# Patient Record
Sex: FEMALE | Race: WHITE | NOT HISPANIC OR LATINO | Employment: STUDENT | URBAN - METROPOLITAN AREA
[De-identification: names, ages, dates, MRNs, and addresses within clinical notes are randomized per-mention and may not be internally consistent; named-entity substitution may affect disease eponyms.]

---

## 2017-01-15 DIAGNOSIS — N94.0 OVULATION PAIN: ICD-10-CM

## 2017-01-16 RX ORDER — NORETHINDRONE ACETATE AND ETHINYL ESTRADIOL AND FERROUS FUMARATE 1.5-30(21)
KIT ORAL
Qty: 28 TABLET | Refills: 0 | OUTPATIENT
Start: 2017-01-16

## 2017-10-28 DIAGNOSIS — N94.6 DYSMENORRHEA: ICD-10-CM

## 2017-10-28 DIAGNOSIS — Z87.42 HISTORY OF OVARIAN CYST: ICD-10-CM

## 2017-10-30 RX ORDER — NORETHINDRONE ACETATE AND ETHINYL ESTRADIOL AND FERROUS FUMARATE 1.5-30(21)
KIT ORAL
Qty: 84 TABLET | Refills: 0 | Status: SHIPPED | OUTPATIENT
Start: 2017-10-30 | End: 2018-01-16 | Stop reason: SDUPTHER

## 2018-01-16 DIAGNOSIS — N94.6 DYSMENORRHEA: ICD-10-CM

## 2018-01-16 DIAGNOSIS — Z87.42 HISTORY OF OVARIAN CYST: ICD-10-CM

## 2018-01-16 RX ORDER — NORETHINDRONE ACETATE AND ETHINYL ESTRADIOL 1.5-30(21)
KIT ORAL
Qty: 84 TABLET | Refills: 0 | Status: SHIPPED | OUTPATIENT
Start: 2018-01-16 | End: 2018-04-09 | Stop reason: SDUPTHER

## 2018-04-09 DIAGNOSIS — Z87.42 HISTORY OF OVARIAN CYST: ICD-10-CM

## 2018-04-09 DIAGNOSIS — N94.6 DYSMENORRHEA: ICD-10-CM

## 2018-04-09 RX ORDER — NORETHINDRONE ACETATE AND ETHINYL ESTRADIOL 1.5-30(21)
KIT ORAL
Qty: 84 TABLET | Refills: 0 | Status: SHIPPED | OUTPATIENT
Start: 2018-04-09 | End: 2018-06-27 | Stop reason: SDUPTHER

## 2018-06-27 DIAGNOSIS — Z87.42 HISTORY OF OVARIAN CYST: ICD-10-CM

## 2018-06-27 DIAGNOSIS — N94.6 DYSMENORRHEA: ICD-10-CM

## 2018-06-27 RX ORDER — NORETHINDRONE ACETATE AND ETHINYL ESTRADIOL 1.5-30(21)
KIT ORAL
Qty: 84 TABLET | Refills: 0 | Status: SHIPPED | OUTPATIENT
Start: 2018-06-27 | End: 2018-09-13 | Stop reason: SDUPTHER

## 2018-09-13 DIAGNOSIS — Z87.42 HISTORY OF OVARIAN CYST: ICD-10-CM

## 2018-09-13 DIAGNOSIS — N94.6 DYSMENORRHEA: ICD-10-CM

## 2018-09-13 RX ORDER — NORETHINDRONE ACETATE AND ETHINYL ESTRADIOL 1.5-30(21)
KIT ORAL
Qty: 84 TABLET | Refills: 0 | Status: SHIPPED | OUTPATIENT
Start: 2018-09-13 | End: 2018-12-04 | Stop reason: SDUPTHER

## 2018-12-04 DIAGNOSIS — Z87.42 HISTORY OF OVARIAN CYST: ICD-10-CM

## 2018-12-04 DIAGNOSIS — N94.6 DYSMENORRHEA: ICD-10-CM

## 2018-12-05 RX ORDER — NORETHINDRONE ACETATE AND ETHINYL ESTRADIOL 1.5-30(21)
KIT ORAL
Qty: 84 TABLET | Refills: 0 | Status: SHIPPED | OUTPATIENT
Start: 2018-12-05 | End: 2019-02-22 | Stop reason: SDUPTHER

## 2019-02-22 DIAGNOSIS — Z87.42 HISTORY OF OVARIAN CYST: ICD-10-CM

## 2019-02-22 DIAGNOSIS — N94.6 DYSMENORRHEA: ICD-10-CM

## 2019-02-22 RX ORDER — NORETHINDRONE ACETATE AND ETHINYL ESTRADIOL 1.5-30(21)
KIT ORAL
Qty: 84 TABLET | Refills: 0 | Status: SHIPPED | OUTPATIENT
Start: 2019-02-22 | End: 2019-05-19 | Stop reason: SDUPTHER

## 2019-05-19 DIAGNOSIS — N94.6 DYSMENORRHEA: ICD-10-CM

## 2019-05-19 DIAGNOSIS — Z87.42 HISTORY OF OVARIAN CYST: ICD-10-CM

## 2019-05-20 RX ORDER — NORETHINDRONE ACETATE AND ETHINYL ESTRADIOL 1.5-30(21)
KIT ORAL
Qty: 84 TABLET | Refills: 0 | Status: SHIPPED | OUTPATIENT
Start: 2019-05-20 | End: 2019-08-11 | Stop reason: SDUPTHER

## 2019-08-11 DIAGNOSIS — N94.6 DYSMENORRHEA: ICD-10-CM

## 2019-08-11 DIAGNOSIS — Z87.42 HISTORY OF OVARIAN CYST: ICD-10-CM

## 2019-08-12 RX ORDER — NORETHINDRONE ACETATE AND ETHINYL ESTRADIOL 1.5-30(21)
KIT ORAL
Qty: 84 TABLET | Refills: 0 | Status: SHIPPED | OUTPATIENT
Start: 2019-08-12 | End: 2019-11-07 | Stop reason: SDUPTHER

## 2019-11-07 DIAGNOSIS — Z87.42 HISTORY OF OVARIAN CYST: ICD-10-CM

## 2019-11-07 DIAGNOSIS — N94.6 DYSMENORRHEA: ICD-10-CM

## 2019-11-07 RX ORDER — NORETHINDRONE ACETATE AND ETHINYL ESTRADIOL 1.5-30(21)
KIT ORAL
Qty: 84 TABLET | Refills: 0 | Status: SHIPPED | OUTPATIENT
Start: 2019-11-07 | End: 2019-12-17 | Stop reason: SDUPTHER

## 2019-12-17 ENCOUNTER — OFFICE VISIT (OUTPATIENT)
Dept: OBSTETRICS AND GYNECOLOGY | Facility: CLINIC | Age: 24
End: 2019-12-17
Payer: COMMERCIAL

## 2019-12-17 VITALS — DIASTOLIC BLOOD PRESSURE: 72 MMHG | WEIGHT: 138 LBS | BODY MASS INDEX: 23.69 KG/M2 | SYSTOLIC BLOOD PRESSURE: 126 MMHG

## 2019-12-17 DIAGNOSIS — Z01.419 WOMEN'S ANNUAL ROUTINE GYNECOLOGICAL EXAMINATION: Primary | ICD-10-CM

## 2019-12-17 DIAGNOSIS — Z12.4 ENCOUNTER FOR PAPANICOLAOU SMEAR FOR CERVICAL CANCER SCREENING: ICD-10-CM

## 2019-12-17 DIAGNOSIS — Z30.9 ENCOUNTER FOR CONTRACEPTIVE MANAGEMENT, UNSPECIFIED TYPE: ICD-10-CM

## 2019-12-17 PROCEDURE — 99999 PR PBB SHADOW E&M-EST. PATIENT-LVL III: ICD-10-PCS | Mod: PBBFAC,,, | Performed by: NURSE PRACTITIONER

## 2019-12-17 PROCEDURE — 99999 PR PBB SHADOW E&M-EST. PATIENT-LVL III: CPT | Mod: PBBFAC,,, | Performed by: NURSE PRACTITIONER

## 2019-12-17 PROCEDURE — 99385 PREV VISIT NEW AGE 18-39: CPT | Mod: S$GLB,,, | Performed by: NURSE PRACTITIONER

## 2019-12-17 PROCEDURE — 88175 CYTOPATH C/V AUTO FLUID REDO: CPT

## 2019-12-17 PROCEDURE — 99385 PR PREVENTIVE VISIT,NEW,18-39: ICD-10-PCS | Mod: S$GLB,,, | Performed by: NURSE PRACTITIONER

## 2019-12-17 RX ORDER — NORETHINDRONE ACETATE AND ETHINYL ESTRADIOL 1.5-30(21)
1 KIT ORAL DAILY
Qty: 84 TABLET | Refills: 3 | Status: SHIPPED | OUTPATIENT
Start: 2019-12-17 | End: 2020-12-04 | Stop reason: SDUPTHER

## 2019-12-17 RX ORDER — LINACLOTIDE 145 UG/1
CAPSULE, GELATIN COATED ORAL
Refills: 0 | COMMUNITY
Start: 2019-11-18 | End: 2022-06-14

## 2019-12-17 RX ORDER — CETIRIZINE HYDROCHLORIDE 10 MG/1
TABLET ORAL
Refills: 0 | COMMUNITY
Start: 2019-10-14 | End: 2023-08-23

## 2019-12-17 NOTE — PROGRESS NOTES
CC: Annual  HPI: Pt is a 24 y.o.  female who presents for routine annual exam.  She recently got accepted in [employer].  She uses OCPs for contraception. She does not want STD screening.  Denies any GYN complaints.  The patient participates in regular exercise: yes.  The patient does not smoke.  The patient wears seatbelts.   Pt denies any domestic violence.  She is s/p the HPV vaccine series.        FH:  Breast cancer: paternal grandmother (PMB at dx)  Colon cancer: none  Ovarian cancer: none  Endometrial cancer: none    ROS:  GENERAL: Feeling well overall. Denies fever or chills.   SKIN: Denies rash or lesions.   HEAD: Denies head injury or headache.   NODES: Denies enlarged lymph nodes.   CHEST: Denies chest pain or shortness of breath.   CARDIOVASCULAR: Denies palpitations or left sided chest pain.   ABDOMEN: No abdominal pain, constipation, diarrhea, nausea, vomiting or rectal bleeding.   URINARY: No dysuria, hematuria, or burning on urination.  REPRODUCTIVE: See HPI.   BREASTS: Denies pain, lumps, or nipple discharge.   HEMATOLOGIC: No easy bruisability or excessive bleeding.   MUSCULOSKELETAL: Denies joint pain or swelling.   NEUROLOGIC: Denies syncope or weakness.   PSYCHIATRIC: Denies depression, anxiety or mood swings.    PE:   APPEARANCE: Well nourished, well developed, White female in no acute distress.  NODES: no cervical, supraclavicular, or inguinal lymphadenopathy  BREASTS: Symmetrical, no skin changes or visible lesions. No palpable masses, nipple discharge or adenopathy bilaterally.  ABDOMEN: Soft. No tenderness or masses. No distention. No hernias palpated. No CVA tenderness.  VULVA: No lesions. Normal external female genitalia.  URETHRAL MEATUS: Normal size and location, no lesions, no prolapse.  URETHRA: No masses, tenderness, or prolapse.  VAGINA: Moist. No lesions or lacerations noted. No abnormal discharge present. No odor present.   CERVIX: No lesions or discharge. No cervical motion  tenderness.   UTERUS: Normal size, regular shape, mobile, non-tender.  ADNEXA: No tenderness. No fullness or masses palpated in the adnexal regions.   ANUS PERINEUM: Normal.      Diagnosis:  1. Women's annual routine gynecological examination    2. Encounter for Papanicolaou smear for cervical cancer screening    3. Encounter for contraceptive management, unspecified type        Plan:   Pap  OCPs refilled     Orders Placed This Encounter    Liquid-Based Pap Smear, Screening    norethindrone-ethinyl estradiol-iron (BLISOVI FE 1.5/30, 28,) 1.5 mg-30 mcg (21)/75 mg (7) tablet       Patient was counseled today on the new ACS guidelines for cervical cytology screening as well as the current recommendations for breast cancer screening. She was counseled to follow up with her PCP for other routine health maintenance. Counseling session lasted approximately 10 minutes, and all her questions were answered.    Follow-up with me in 1 year for routine exam    Kelly Mena, CHASEC

## 2019-12-20 ENCOUNTER — PATIENT OUTREACH (OUTPATIENT)
Dept: ADMINISTRATIVE | Facility: HOSPITAL | Age: 24
End: 2019-12-20

## 2020-01-06 LAB
FINAL PATHOLOGIC DIAGNOSIS: NORMAL
Lab: NORMAL

## 2020-05-22 ENCOUNTER — TELEPHONE (OUTPATIENT)
Dept: INTERNAL MEDICINE | Facility: CLINIC | Age: 25
End: 2020-05-22

## 2020-05-22 NOTE — TELEPHONE ENCOUNTER
----- Message from Eduarda Nj sent at 5/22/2020 10:14 AM CDT -----  Contact: self  Type:  Requesting Shot Appointment Request    Name of Caller:patient need to schedule appointment for a TDAP shot  When is the first available appointment?  Symptoms:  Best Call Back Number:[phone]  Additional Information:

## 2020-06-18 ENCOUNTER — IMMUNIZATION (OUTPATIENT)
Dept: PHARMACY | Facility: CLINIC | Age: 25
End: 2020-06-18
Payer: COMMERCIAL

## 2020-12-04 ENCOUNTER — OFFICE VISIT (OUTPATIENT)
Dept: OBSTETRICS AND GYNECOLOGY | Facility: CLINIC | Age: 25
End: 2020-12-04
Payer: COMMERCIAL

## 2020-12-04 VITALS
SYSTOLIC BLOOD PRESSURE: 122 MMHG | DIASTOLIC BLOOD PRESSURE: 76 MMHG | HEIGHT: 64 IN | WEIGHT: 137.56 LBS | BODY MASS INDEX: 23.49 KG/M2

## 2020-12-04 DIAGNOSIS — Z30.41 ORAL CONTRACEPTIVE PILL SURVEILLANCE: ICD-10-CM

## 2020-12-04 DIAGNOSIS — Z01.419 ENCOUNTER FOR ANNUAL ROUTINE GYNECOLOGICAL EXAMINATION: Primary | ICD-10-CM

## 2020-12-04 DIAGNOSIS — B96.89 BV (BACTERIAL VAGINOSIS): ICD-10-CM

## 2020-12-04 DIAGNOSIS — N88.8 FRIABLE CERVIX: ICD-10-CM

## 2020-12-04 DIAGNOSIS — N76.0 BV (BACTERIAL VAGINOSIS): ICD-10-CM

## 2020-12-04 DIAGNOSIS — Z11.3 SCREENING EXAMINATION FOR STD (SEXUALLY TRANSMITTED DISEASE): ICD-10-CM

## 2020-12-04 LAB
BACTERIA HYPHAE, POC: NEGATIVE
GARDNERELLA VAGINALIS: NEGATIVE
OTHER MICROSC. OBSERVATIONS: ABNORMAL
POC BACTERIAL VAGINOSIS: NEGATIVE
POC CLUE CELLS: POSITIVE
TRICHOMONAS, POC: NEGATIVE
YEAST WET PREP: NEGATIVE
YEAST, POC: NEGATIVE

## 2020-12-04 PROCEDURE — 99395 PREV VISIT EST AGE 18-39: CPT | Mod: 25,S$GLB,, | Performed by: OBSTETRICS & GYNECOLOGY

## 2020-12-04 PROCEDURE — 87220 TISSUE EXAM FOR FUNGI: CPT | Mod: S$GLB,,, | Performed by: OBSTETRICS & GYNECOLOGY

## 2020-12-04 PROCEDURE — 87210 POCT WET PREP: ICD-10-PCS | Mod: QW,S$GLB,, | Performed by: OBSTETRICS & GYNECOLOGY

## 2020-12-04 PROCEDURE — 87210 SMEAR WET MOUNT SALINE/INK: CPT | Mod: QW,S$GLB,, | Performed by: OBSTETRICS & GYNECOLOGY

## 2020-12-04 PROCEDURE — 99999 PR PBB SHADOW E&M-EST. PATIENT-LVL III: ICD-10-PCS | Mod: PBBFAC,,, | Performed by: OBSTETRICS & GYNECOLOGY

## 2020-12-04 PROCEDURE — 99999 PR PBB SHADOW E&M-EST. PATIENT-LVL III: CPT | Mod: PBBFAC,,, | Performed by: OBSTETRICS & GYNECOLOGY

## 2020-12-04 PROCEDURE — 3008F BODY MASS INDEX DOCD: CPT | Mod: CPTII,S$GLB,, | Performed by: OBSTETRICS & GYNECOLOGY

## 2020-12-04 PROCEDURE — 87220 POCT KOH: ICD-10-PCS | Mod: S$GLB,,, | Performed by: OBSTETRICS & GYNECOLOGY

## 2020-12-04 PROCEDURE — 1126F AMNT PAIN NOTED NONE PRSNT: CPT | Mod: S$GLB,,, | Performed by: OBSTETRICS & GYNECOLOGY

## 2020-12-04 PROCEDURE — 3008F PR BODY MASS INDEX (BMI) DOCUMENTED: ICD-10-PCS | Mod: CPTII,S$GLB,, | Performed by: OBSTETRICS & GYNECOLOGY

## 2020-12-04 PROCEDURE — 99395 PR PREVENTIVE VISIT,EST,18-39: ICD-10-PCS | Mod: 25,S$GLB,, | Performed by: OBSTETRICS & GYNECOLOGY

## 2020-12-04 PROCEDURE — 1126F PR PAIN SEVERITY QUANTIFIED, NO PAIN PRESENT: ICD-10-PCS | Mod: S$GLB,,, | Performed by: OBSTETRICS & GYNECOLOGY

## 2020-12-04 RX ORDER — NORETHINDRONE ACETATE AND ETHINYL ESTRADIOL 1.5-30(21)
1 KIT ORAL DAILY
Qty: 84 TABLET | Refills: 3 | Status: SHIPPED | OUTPATIENT
Start: 2020-12-04 | End: 2021-12-07

## 2020-12-04 RX ORDER — METRONIDAZOLE 500 MG/1
500 TABLET ORAL 2 TIMES DAILY
Qty: 14 TABLET | Refills: 0 | Status: SHIPPED | OUTPATIENT
Start: 2020-12-04 | End: 2020-12-11

## 2020-12-04 NOTE — PROGRESS NOTES
"Chief Complaint: Well Woman Exam     HPI:      Erika Beavers is a 25 y.o.  who presents today for well woman exam.  LMP: Patient's last menstrual period was 2020 (exact date).  No issues, problems, or complaints. Specifically, patient denies abnormal vaginal bleeding, discharge, pelvic pain, urinary problems, or changes in appetite. Ms. Beavers is currently sexually active with a single male partner. She is currently using condoms, oral contraceptives (estrogen/progesterone) for contraception. She would like STD screening today.    Previous Pap:  NILM (2020)      Gardasil:Completed     Ms. Beavers confirms that she wears her seatbelt when riding in the car and does not text while driving.     OB History        0    Para   0    Term   0       0    AB   0    Living   0       SAB   0    TAB   0    Ectopic   0    Multiple   0    Live Births               Obstetric Comments   Menarche- 15             ROS:     GENERAL: Denies unintentional weight gain or weight loss. Feeling well overall.   SKIN: Denies rash or lesions.   HEENT: Denies headaches, or vision changes.   CARDIOVASCULAR: Denies palpitations or chest pain.   RESPIRATORY: Denies shortness of breath or dyspnea on exertion.  BREASTS: Denies pain, lumps, or nipple discharge.   ABDOMEN: Denies abdominal pain, constipation, diarrhea, nausea, vomiting, change in appetite.  URINARY: Denies frequency, dysuria, hematuria.  NEUROLOGIC: Denies syncope or weakness.   PSYCHIATRIC: Denies depression, anxiety or mood swings.    Physical Exam:      PHYSICAL EXAM:  /76   Ht 5' 4" (1.626 m)   Wt 62.4 kg (137 lb 9.1 oz)   LMP 2020 (Exact Date)   BMI 23.61 kg/m²   Body mass index is 23.61 kg/m².     APPEARANCE: Well nourished, well developed, in no acute distress.  PSYCH: Appropriate mood and affect.  SKIN: No acne or hirsutism  NECK: Neck symmetric without masses or thyromegaly  NODES: No inguinal, axillary, or supraclavicular lymph " node enlargement  ABDOMEN: Soft.  No tenderness or masses.    CARDIOVASCULAR: No edema of peripheral extremities  BREASTS: Symmetrical, no skin changes or visible lesions.  No palpable masses or nipple discharge bilaterally.  PELVIC: Normal external genitalia without lesions.  Normal hair distribution.  Adequate perineal body, normal urethral meatus.  Vagina moist and well rugated without lesions. Small amount of frothy white discharge present.  Cervix pink, without lesions. +Friable. No significant cystocele or rectocele.  Bimanual exam shows uterus to be normal size, regular, mobile and nontender.  Adnexa without masses or tenderness.      Assessment/Plan:     Encounter for annual routine gynecological examination    Oral contraceptive pill surveillance  -     norethindrone-ethinyl estradiol-iron (BLISOVI FE 1.5/30, 28,) 1.5 mg-30 mcg (21)/75 mg (7) tablet; Take 1 tablet by mouth once daily.  Dispense: 84 tablet; Refill: 3    Screening examination for STD (sexually transmitted disease)  -     C. trachomatis/N. gonorrhoeae by AMP DNA Other; Cervicovaginal    BV (bacterial vaginosis)  -     metroNIDAZOLE (FLAGYL) 500 MG tablet; Take 1 tablet (500 mg total) by mouth 2 (two) times daily. Do not drink alcohol while taking this medication. for 7 days  Dispense: 14 tablet; Refill: 0    Friable cervix  -     POCT KOH  -     POCT WET PREP        Follow up in about 1 year (around 12/4/2021) for Annual Exam.    Counseling:     Patient was counseled today on current ASCCP pap guidelines, the recommendation for yearly pelvic exams, healthy diet and exercise routines, breast self awareness.She is to see her PCP for other health maintenance.     Use of the [facility] Patient Portal discussed and encouraged during today's visit.

## 2020-12-07 LAB
C TRACH RRNA SPEC QL NAA+PROBE: NEGATIVE
N GONORRHOEA RRNA SPEC QL NAA+PROBE: NEGATIVE

## 2021-04-13 ENCOUNTER — PATIENT MESSAGE (OUTPATIENT)
Dept: RESEARCH | Facility: HOSPITAL | Age: 26
End: 2021-04-13

## 2021-06-10 ENCOUNTER — PATIENT MESSAGE (OUTPATIENT)
Dept: OBSTETRICS AND GYNECOLOGY | Facility: CLINIC | Age: 26
End: 2021-06-10

## 2021-06-10 DIAGNOSIS — R39.89 SUSPECTED UTI: Primary | ICD-10-CM

## 2021-06-10 RX ORDER — NITROFURANTOIN 25; 75 MG/1; MG/1
100 CAPSULE ORAL 2 TIMES DAILY
Qty: 6 CAPSULE | Refills: 0 | Status: SHIPPED | OUTPATIENT
Start: 2021-06-10 | End: 2021-06-13

## 2022-06-13 ENCOUNTER — TELEPHONE (OUTPATIENT)
Dept: OBSTETRICS AND GYNECOLOGY | Facility: CLINIC | Age: 27
End: 2022-06-13
Payer: COMMERCIAL

## 2022-06-13 NOTE — TELEPHONE ENCOUNTER
Pt has been having pain with sex and now having d/c and itching and requesting STD testing.    Scheduled pt tomorrow at the [facility].

## 2022-06-14 ENCOUNTER — OFFICE VISIT (OUTPATIENT)
Dept: OBSTETRICS AND GYNECOLOGY | Facility: CLINIC | Age: 27
End: 2022-06-14
Payer: COMMERCIAL

## 2022-06-14 VITALS
HEIGHT: 64 IN | WEIGHT: 136 LBS | SYSTOLIC BLOOD PRESSURE: 122 MMHG | BODY MASS INDEX: 23.22 KG/M2 | DIASTOLIC BLOOD PRESSURE: 76 MMHG

## 2022-06-14 DIAGNOSIS — N89.8 VAGINAL DISCHARGE: Primary | ICD-10-CM

## 2022-06-14 DIAGNOSIS — N89.8 VAGINAL ITCHING: ICD-10-CM

## 2022-06-14 PROCEDURE — 1159F MED LIST DOCD IN RCRD: CPT | Mod: CPTII,S$GLB,, | Performed by: ADVANCED PRACTICE MIDWIFE

## 2022-06-14 PROCEDURE — 99999 PR PBB SHADOW E&M-EST. PATIENT-LVL III: CPT | Mod: PBBFAC,,, | Performed by: ADVANCED PRACTICE MIDWIFE

## 2022-06-14 PROCEDURE — 3074F PR MOST RECENT SYSTOLIC BLOOD PRESSURE < 130 MM HG: ICD-10-PCS | Mod: CPTII,S$GLB,, | Performed by: ADVANCED PRACTICE MIDWIFE

## 2022-06-14 PROCEDURE — 99999 PR PBB SHADOW E&M-EST. PATIENT-LVL III: ICD-10-PCS | Mod: PBBFAC,,, | Performed by: ADVANCED PRACTICE MIDWIFE

## 2022-06-14 PROCEDURE — 99213 OFFICE O/P EST LOW 20 MIN: CPT | Mod: S$GLB,,, | Performed by: ADVANCED PRACTICE MIDWIFE

## 2022-06-14 PROCEDURE — 1159F PR MEDICATION LIST DOCUMENTED IN MEDICAL RECORD: ICD-10-PCS | Mod: CPTII,S$GLB,, | Performed by: ADVANCED PRACTICE MIDWIFE

## 2022-06-14 PROCEDURE — 87510 GARDNER VAG DNA DIR PROBE: CPT | Performed by: ADVANCED PRACTICE MIDWIFE

## 2022-06-14 PROCEDURE — 3008F PR BODY MASS INDEX (BMI) DOCUMENTED: ICD-10-PCS | Mod: CPTII,S$GLB,, | Performed by: ADVANCED PRACTICE MIDWIFE

## 2022-06-14 PROCEDURE — 3078F PR MOST RECENT DIASTOLIC BLOOD PRESSURE < 80 MM HG: ICD-10-PCS | Mod: CPTII,S$GLB,, | Performed by: ADVANCED PRACTICE MIDWIFE

## 2022-06-14 PROCEDURE — 87591 N.GONORRHOEAE DNA AMP PROB: CPT | Performed by: ADVANCED PRACTICE MIDWIFE

## 2022-06-14 PROCEDURE — 3008F BODY MASS INDEX DOCD: CPT | Mod: CPTII,S$GLB,, | Performed by: ADVANCED PRACTICE MIDWIFE

## 2022-06-14 PROCEDURE — 3074F SYST BP LT 130 MM HG: CPT | Mod: CPTII,S$GLB,, | Performed by: ADVANCED PRACTICE MIDWIFE

## 2022-06-14 PROCEDURE — 3078F DIAST BP <80 MM HG: CPT | Mod: CPTII,S$GLB,, | Performed by: ADVANCED PRACTICE MIDWIFE

## 2022-06-14 PROCEDURE — 87491 CHLMYD TRACH DNA AMP PROBE: CPT | Performed by: ADVANCED PRACTICE MIDWIFE

## 2022-06-14 PROCEDURE — 99213 PR OFFICE/OUTPT VISIT, EST, LEVL III, 20-29 MIN: ICD-10-PCS | Mod: S$GLB,,, | Performed by: ADVANCED PRACTICE MIDWIFE

## 2022-06-14 RX ORDER — FLUCONAZOLE 200 MG/1
200 TABLET ORAL EVERY OTHER DAY
Qty: 2 TABLET | Refills: 0 | Status: SHIPPED | OUTPATIENT
Start: 2022-06-14 | End: 2022-06-17

## 2022-06-14 NOTE — PROGRESS NOTES
Erika Beavers is a 26 y.o. female  [facility] with complaint of vaginal discharge for  3-4 days.   She reports itching, and denies odor.  She states the discharge is brown.    Pt currently on OCPs and has not missed any pills.     ROS:  GENERAL: No fever, chills, fatigability or weight loss.  VULVAR: No pain, no lesions and no itching.  VAGINAL: No relaxation, no abnormal bleeding and no lesions. Reports itching and dark brown discharge. Pt also reports some pain with intercourse.   ABDOMEN: No abdominal pain. Denies nausea. Denies vomiting. No diarrhea. No constipation  BREAST: Denies pain. No lumps. No discharge.  URINARY: No incontinence, no nocturia, no frequency and no dysuria.        Review of patient's allergies indicates:  No Known Allergies    Current Outpatient Medications:     AUROVELA FE 1.5/30, 28, 1.5 mg-30 mcg (21)/75 mg (7) tablet, TAKE 1 TABLET BY MOUTH EVERY DAY, Disp: 84 tablet, Rfl: 0    AUROVELA FE 1.5/30, 28, 1.5 mg-30 mcg (21)/75 mg (7) tablet, TAKE 1 TABLET BY MOUTH EVERY DAY, Disp: 84 tablet, Rfl: 0    cetirizine (ZYRTEC) 10 MG tablet, TK 1 T PO  QHS, Disp: , Rfl: 0    fluconazole (DIFLUCAN) 200 MG Tab, Take 1 tablet (200 mg total) by mouth every other day. for 2 doses, Disp: 2 tablet, Rfl: 0    LINZESS 145 mcg Cap capsule, TK ONE C PO  ONCE A DAY ON AN EMPTY STOMACH., Disp: , Rfl: 0    MICROGESTIN FE 1.5/30, 28, 1.5 mg-30 mcg (21)/75 mg (7) tablet, TK 1 T PO QD, Disp: , Rfl: 1    Past Medical History:   Diagnosis Date    Ovarian cyst      Past Surgical History:   Procedure Laterality Date    none       Social History     Tobacco Use    Smoking status: Never Smoker    Smokeless tobacco: Never Used   Substance Use Topics    Alcohol use: Yes     Alcohol/week: 0.0 standard drinks     Comment: socially    Drug use: No     OB History    Para Term  AB Living   0 0 0 0 0 0   SAB IAB Ectopic Multiple Live Births   0 0 0 0     Obstetric  "Comments   Menarche- 15       /76   Ht 5' 4" (1.626 m)   Wt 61.7 kg (136 lb 0.4 oz)   LMP 05/23/2022   BMI 23.35 kg/m²     PHYSICAL EXAM:  GENERAL: Calm and appropriate affect, alert, oriented x4  SKIN: Color appropriate for race, warm and dry, clean and intact with no rashes.  RESP: Even, unlabored breathing  ABDOMEN: Soft, nontender, no masses.  :   Normal external female genitalia without lesions. Normal hair distribution. Adequate perineal body, normal urethral meatus.  Vagina pink and well rugated, no lesions, vaginal discharge - blood noted, no odor   No significant cystocele or rectocele.  Cervix  no cervical motion tenderness.          ASSESSMENT / PLAN:    ICD-10-CM ICD-9-CM    1. Vaginal discharge  N89.8 623.5 Vaginosis Screen by DNA Probe      C. trachomatis/N. gonorrhoeae by AMP DNA      CANCELED: C. trachomatis/N. gonorrhoeae by AMP DNA   2. Vaginal itching  N89.8 698.1 fluconazole (DIFLUCAN) 200 MG Tab     Vaginal discharge  -     Vaginosis Screen by DNA Probe  -     Cancel: C. trachomatis/N. gonorrhoeae by AMP DNA  -     C. trachomatis/N. gonorrhoeae by AMP DNA    Vaginal itching  -     fluconazole (DIFLUCAN) 200 MG Tab; Take 1 tablet (200 mg total) by mouth every other day. for 2 doses  Dispense: 2 tablet; Refill: 0          Patient was counseled today on  Possible etiology of vaginal itching and breakthrough bleeding. Discussed diflucan for itching and will follow up p culture results. Advised continue to take OCPs and monitor bleeding.       FOLLOW UP:   Pending lab results, PRN lack of improvement.    "

## 2022-06-15 LAB
CANDIDA RRNA VAG QL PROBE: NEGATIVE
G VAGINALIS RRNA GENITAL QL PROBE: POSITIVE
T VAGINALIS RRNA GENITAL QL PROBE: NEGATIVE

## 2022-06-16 ENCOUNTER — PATIENT MESSAGE (OUTPATIENT)
Dept: OBSTETRICS AND GYNECOLOGY | Facility: CLINIC | Age: 27
End: 2022-06-16
Payer: COMMERCIAL

## 2022-06-16 DIAGNOSIS — N76.0 BV (BACTERIAL VAGINOSIS): Primary | ICD-10-CM

## 2022-06-16 DIAGNOSIS — B96.89 BV (BACTERIAL VAGINOSIS): Primary | ICD-10-CM

## 2022-06-16 RX ORDER — METRONIDAZOLE 500 MG/1
500 TABLET ORAL EVERY 12 HOURS
Qty: 14 TABLET | Refills: 0 | Status: SHIPPED | OUTPATIENT
Start: 2022-06-16 | End: 2022-06-23

## 2022-06-17 LAB
C TRACH DNA SPEC QL NAA+PROBE: NOT DETECTED
N GONORRHOEA DNA SPEC QL NAA+PROBE: NOT DETECTED

## 2022-08-11 NOTE — PROGRESS NOTES
"Chief Complaint: Well Woman Exam     HPI:      Erika Beavers is a 26 y.o.  who presents today for well woman exam.  LMP: Patient's last menstrual period was 2022 (exact date).  Pt has been having pain with initial penetration since she became sexually active with current partner. Pain lasts for a few minutes. Had this to a much lesser extent with previous partner. Notes less discomfort when she has intercourse after alcohol consumption. Has been using lubricant and does not feel like dryness is a contributing factor. Specifically, patient denies abnormal vaginal bleeding, discharge, pelvic pain, urinary problems, or changes in appetite. Ms. Beavers is currently sexually active with a single male partner. She is currently using oral contraceptives (estrogen/progesterone) for contraception. She declines STD screening today.    Previous Pap: NILM (2020)    Gardasil:Completed     Ms. Beavers confirms that she wears her seatbelt when riding in the car and does not text while driving.     OB History        0    Para   0    Term   0       0    AB   0    Living   0       SAB   0    IAB   0    Ectopic   0    Multiple   0    Live Births               Obstetric Comments   Menarche- 15             ROS:     GENERAL: Denies unintentional weight gain or weight loss. Feeling well overall.   SKIN: Denies rash or lesions.   HEENT: Denies headaches, or vision changes.   CARDIOVASCULAR: Denies palpitations or chest pain.   RESPIRATORY: Denies shortness of breath or dyspnea on exertion.  BREASTS: Denies lumps or nipple discharge.   ABDOMEN: Denies nausea, vomiting, change in appetite. +IBS  URINARY: Denies frequency, dysuria.  NEUROLOGIC: Denies syncope or weakness.   PSYCHIATRIC: Denies uncontrolled depression or anxiety.    Physical Exam:      PHYSICAL EXAM:  /70   Ht 5' 4" (1.626 m)   Wt 62.4 kg (137 lb 7.3 oz)   LMP 2022 (Exact Date)   BMI 23.59 kg/m²   Body mass index is 23.59 " kg/m².     APPEARANCE: Well nourished, well developed, in no acute distress.  PSYCH: Appropriate mood and affect.  SKIN: No acne or hirsutism  NECK: Neck symmetric without masses  NODES: No inguinal, axillary, or supraclavicular lymph node enlargement  ABDOMEN: Soft.  No tenderness or masses.    CARDIOVASCULAR: No edema of peripheral extremities  BREASTS: Symmetrical, no visible skin lesions. No palpable masses in left breast. Right breast with density in the upper outer quadrant that was not noted in the left breast. No nipple discharge bilaterally.  PELVIC: Normal external genitalia without lesions.  Normal hair distribution.  Adequate perineal body, normal urethral meatus.  Vagina moist and well rugated without lesions or discharge.  Cervix pink, without lesions, discharge or tenderness.  No significant cystocele or rectocele.  Bimanual exam shows uterus to be normal size, regular, mobile and nontender.  Adnexa without masses or tenderness.      Assessment/Plan:     Encounter for screening for cervical cancer  -     Liquid-Based Pap Smear, Screening    Oral contraceptive pill surveillance  -     norethindrone-ethinyl estradiol-iron (AUROVELA FE 1.5/30, 28,) 1.5 mg-30 mcg (21)/75 mg (7) tablet; Take 1 tablet by mouth once daily.  Dispense: 84 tablet; Refill: 3    Dyspareunia, female - likely 2/2 small vaginal canal paired with well endowed partner. Pelvic floor PT to learn relaxation techniques.  -     Vaginosis Screen by DNA Probe  -     Ambulatory referral/consult to Physical/Occupational Therapy; Future; Expected date: 08/19/2022    Breast lump  -     US Breast Right Limited; Future; Expected date: 08/12/2022      Follow up in about 1 year (around 8/12/2023) for Annual Exam.    Counseling:     Patient was counseled today on current ASCCP pap guidelines, the recommendation for yearly physical exams, safe driving habits, breast self awareness. She is to see her PCP for other health maintenance.     Use of the  [facility] Patient Portal discussed and encouraged during today's visit.

## 2022-08-12 ENCOUNTER — OFFICE VISIT (OUTPATIENT)
Dept: OBSTETRICS AND GYNECOLOGY | Facility: CLINIC | Age: 27
End: 2022-08-12
Payer: COMMERCIAL

## 2022-08-12 VITALS
HEIGHT: 64 IN | DIASTOLIC BLOOD PRESSURE: 70 MMHG | SYSTOLIC BLOOD PRESSURE: 118 MMHG | BODY MASS INDEX: 23.46 KG/M2 | WEIGHT: 137.44 LBS

## 2022-08-12 DIAGNOSIS — N94.10 DYSPAREUNIA, FEMALE: ICD-10-CM

## 2022-08-12 DIAGNOSIS — N63.0 BREAST LUMP: ICD-10-CM

## 2022-08-12 DIAGNOSIS — Z12.4 ENCOUNTER FOR SCREENING FOR CERVICAL CANCER: Primary | ICD-10-CM

## 2022-08-12 DIAGNOSIS — Z30.41 ORAL CONTRACEPTIVE PILL SURVEILLANCE: ICD-10-CM

## 2022-08-12 PROCEDURE — 1160F PR REVIEW ALL MEDS BY PRESCRIBER/CLIN PHARMACIST DOCUMENTED: ICD-10-PCS | Mod: CPTII,S$GLB,, | Performed by: OBSTETRICS & GYNECOLOGY

## 2022-08-12 PROCEDURE — 99999 PR PBB SHADOW E&M-EST. PATIENT-LVL III: CPT | Mod: PBBFAC,,, | Performed by: OBSTETRICS & GYNECOLOGY

## 2022-08-12 PROCEDURE — 1159F PR MEDICATION LIST DOCUMENTED IN MEDICAL RECORD: ICD-10-PCS | Mod: CPTII,S$GLB,, | Performed by: OBSTETRICS & GYNECOLOGY

## 2022-08-12 PROCEDURE — 3074F PR MOST RECENT SYSTOLIC BLOOD PRESSURE < 130 MM HG: ICD-10-PCS | Mod: CPTII,S$GLB,, | Performed by: OBSTETRICS & GYNECOLOGY

## 2022-08-12 PROCEDURE — 3008F PR BODY MASS INDEX (BMI) DOCUMENTED: ICD-10-PCS | Mod: CPTII,S$GLB,, | Performed by: OBSTETRICS & GYNECOLOGY

## 2022-08-12 PROCEDURE — 3078F PR MOST RECENT DIASTOLIC BLOOD PRESSURE < 80 MM HG: ICD-10-PCS | Mod: CPTII,S$GLB,, | Performed by: OBSTETRICS & GYNECOLOGY

## 2022-08-12 PROCEDURE — 99395 PREV VISIT EST AGE 18-39: CPT | Mod: S$GLB,,, | Performed by: OBSTETRICS & GYNECOLOGY

## 2022-08-12 PROCEDURE — 3078F DIAST BP <80 MM HG: CPT | Mod: CPTII,S$GLB,, | Performed by: OBSTETRICS & GYNECOLOGY

## 2022-08-12 PROCEDURE — 88175 CYTOPATH C/V AUTO FLUID REDO: CPT | Performed by: OBSTETRICS & GYNECOLOGY

## 2022-08-12 PROCEDURE — 3008F BODY MASS INDEX DOCD: CPT | Mod: CPTII,S$GLB,, | Performed by: OBSTETRICS & GYNECOLOGY

## 2022-08-12 PROCEDURE — 87481 CANDIDA DNA AMP PROBE: CPT | Mod: 59 | Performed by: OBSTETRICS & GYNECOLOGY

## 2022-08-12 PROCEDURE — 1159F MED LIST DOCD IN RCRD: CPT | Mod: CPTII,S$GLB,, | Performed by: OBSTETRICS & GYNECOLOGY

## 2022-08-12 PROCEDURE — 3074F SYST BP LT 130 MM HG: CPT | Mod: CPTII,S$GLB,, | Performed by: OBSTETRICS & GYNECOLOGY

## 2022-08-12 PROCEDURE — 87801 DETECT AGNT MULT DNA AMPLI: CPT | Performed by: OBSTETRICS & GYNECOLOGY

## 2022-08-12 PROCEDURE — 99395 PR PREVENTIVE VISIT,EST,18-39: ICD-10-PCS | Mod: S$GLB,,, | Performed by: OBSTETRICS & GYNECOLOGY

## 2022-08-12 PROCEDURE — 1160F RVW MEDS BY RX/DR IN RCRD: CPT | Mod: CPTII,S$GLB,, | Performed by: OBSTETRICS & GYNECOLOGY

## 2022-08-12 PROCEDURE — 99999 PR PBB SHADOW E&M-EST. PATIENT-LVL III: ICD-10-PCS | Mod: PBBFAC,,, | Performed by: OBSTETRICS & GYNECOLOGY

## 2022-08-12 RX ORDER — NORETHINDRONE ACETATE AND ETHINYL ESTRADIOL 1.5-30(21)
1 KIT ORAL DAILY
Qty: 84 TABLET | Refills: 3 | Status: SHIPPED | OUTPATIENT
Start: 2022-08-12 | End: 2023-07-03

## 2022-08-16 LAB
BACTERIAL VAGINOSIS DNA: NEGATIVE
CANDIDA GLABRATA DNA: NEGATIVE
CANDIDA KRUSEI DNA: NEGATIVE
CANDIDA RRNA VAG QL PROBE: NEGATIVE
T VAGINALIS RRNA GENITAL QL PROBE: NEGATIVE

## 2022-08-18 LAB
FINAL PATHOLOGIC DIAGNOSIS: NORMAL
Lab: NORMAL

## 2022-08-28 ENCOUNTER — PATIENT MESSAGE (OUTPATIENT)
Dept: OBSTETRICS AND GYNECOLOGY | Facility: CLINIC | Age: 27
End: 2022-08-28
Payer: COMMERCIAL

## 2022-08-28 DIAGNOSIS — R39.89 SUSPECTED UTI: Primary | ICD-10-CM

## 2022-08-29 ENCOUNTER — PATIENT MESSAGE (OUTPATIENT)
Dept: OBSTETRICS AND GYNECOLOGY | Facility: CLINIC | Age: 27
End: 2022-08-29
Payer: COMMERCIAL

## 2022-08-29 ENCOUNTER — HOSPITAL ENCOUNTER (OUTPATIENT)
Dept: RADIOLOGY | Facility: HOSPITAL | Age: 27
Discharge: HOME | End: 2022-08-29
Attending: OBSTETRICS & GYNECOLOGY
Payer: COMMERCIAL

## 2022-08-29 VITALS — WEIGHT: 137 LBS | BODY MASS INDEX: 23.39 KG/M2 | HEIGHT: 64 IN

## 2022-08-29 DIAGNOSIS — N63.0 BREAST LUMP: ICD-10-CM

## 2022-08-29 PROBLEM — C50.919 BREAST CANCER: Status: ACTIVE | Noted: 2022-08-29

## 2022-08-29 PROCEDURE — 76642 US BREAST RIGHT LIMITED: ICD-10-PCS | Mod: 26,RT,, | Performed by: RADIOLOGY

## 2022-08-29 PROCEDURE — 76642 ULTRASOUND BREAST LIMITED: CPT | Mod: 26,RT,, | Performed by: RADIOLOGY

## 2022-08-29 PROCEDURE — 76642 ULTRASOUND BREAST LIMITED: CPT | Mod: TC,RT

## 2022-08-29 RX ORDER — NITROFURANTOIN 25; 75 MG/1; MG/1
100 CAPSULE ORAL 2 TIMES DAILY
Qty: 6 CAPSULE | Refills: 0 | Status: SHIPPED | OUTPATIENT
Start: 2022-08-29 | End: 2022-09-01

## 2022-09-02 RX ORDER — NITROFURANTOIN 25; 75 MG/1; MG/1
100 CAPSULE ORAL 2 TIMES DAILY
Qty: 6 CAPSULE | Refills: 0 | Status: SHIPPED | OUTPATIENT
Start: 2022-09-02 | End: 2022-09-05

## 2022-09-09 ENCOUNTER — CLINICAL SUPPORT (OUTPATIENT)
Dept: REHABILITATION | Facility: OTHER | Age: 27
End: 2022-09-09
Attending: OBSTETRICS & GYNECOLOGY
Payer: COMMERCIAL

## 2022-09-09 DIAGNOSIS — M62.89 PELVIC FLOOR TENSION: ICD-10-CM

## 2022-09-09 DIAGNOSIS — R10.2 PELVIC PAIN: ICD-10-CM

## 2022-09-09 DIAGNOSIS — N94.10 DYSPAREUNIA, FEMALE: ICD-10-CM

## 2022-09-09 PROCEDURE — 97161 PT EVAL LOW COMPLEX 20 MIN: CPT | Mod: PN

## 2022-09-09 PROCEDURE — 97530 THERAPEUTIC ACTIVITIES: CPT | Mod: PN

## 2022-09-09 PROCEDURE — 97112 NEUROMUSCULAR REEDUCATION: CPT | Mod: PN

## 2022-09-09 NOTE — PATIENT INSTRUCTIONS
Home Exercise Program: 09/09/2022    DIAPHRAGMATIC BREATHING     The diaphragm is a dome shaped muscle that forms the floor of the rib cage. It is the most efficient muscle for breathing and relaxation, although most people are not used to using the diaphragm. Diaphragmatic or belly breathing is an important technique to learn because it helps settle down or relax the autonomic nervous system. The correct use of diaphragmatic breathing can help to quiet brain activity resulting in the relaxation of all the muscles and organs of the body. This is accomplished by slow rhythmic breathing concentrated in the diaphragm muscle rather than the chest.    360 Breath - Inhale long, slow and deep. You should feel as if your lower ribs are expanding in all directions like the way an umbrella opens. You should feel the belly, back and sides gently expand and you may notice a relaxation in the pelvic floor. If you notice that your belly is pulling up and flattening during your inhale - try to reverse this and allow your belly to gently expand during the inhale while it recoils and flattens during the exhale      Continue to breath like this for 5 minutes. Repeat 1 times/day.       1) Voluntary relaxation - spend time consciously relaxing muscles. Get in comfortable position and focus on relaxing all muscles around hips, low back, and abdominals, then pelvic floor. As you are relaxing pelvic floor muscles think about softening, opening, dropping, letting go. Some people describe like a flower whose petals are opening or like gently laying an egg. Spend 3-5 minutes doing this.  This can be a great position:      2) Diaphragmatic breathing - focus on circumferential expansion. Can do ssitting or laying on back. Should feel like your lower ribs expand in all directions as the abdominals and low back also gently expand and fill with the breath. The anus should gently drop away from you on inhalation. If you are  sitting, you may feel like the anus is floating down towards the seat. 15-20 breaths, or more.    Do above, 1x/day    Also begin to become aware of if/when holding pelvic muscle tension throughout the day and try to relax/let go

## 2022-09-13 PROBLEM — R10.2 PELVIC PAIN: Status: ACTIVE | Noted: 2022-09-13

## 2022-09-13 PROBLEM — M62.89 PELVIC FLOOR TENSION: Status: ACTIVE | Noted: 2022-09-13

## 2022-09-16 NOTE — PLAN OF CARE
"  [facility]  Pelvic Health Physical Therapy Initial Evaluation    Date: 2022   Name: Erika Beavers  Clinic Number: [id number]  Therapy Diagnosis:   Encounter Diagnoses   Name Primary?    Dyspareunia, female     Pelvic floor tension     Pelvic pain      Physician: Jazmyn Washington*    Physician Orders: PT Eval and Treat - Pelvic Floor PT   Medical Diagnosis from Referral: N94.10 (ICD-10-CM) - Dyspareunia, female  Evaluation Date: 2022  Authorization Period Expiration: 2023  Plan of Care Expiration: 2022  Visit # / Visits authorized:     Time In: 1250  Time Out: 1350  Total Appointment Time (timed & untimed codes): 60 minutes  Total Billing Time: 35    Precautions: universal    Subjective     Date of onset:     History of current condition - Erika reports: Pain with insertion during intercourse that lasts a few minutes. Has single male partner. Does not hurt as much when she has consumed alcohol. Hx of similar pain with previous sexual partner, though less severe. Has been using a lubricant without change in pain. Currently taking OBC.  Hx of perineal injury when she was 14 YO; fell onto diving board and "tore to her rectum". 36 stitches required to repair. Hx of IBS; takes Miralax daily. Has been taking for a few weeks. Hx of pain with pelvic examinations and with tampon use. Must use small tampons and experiences pain on days with decreased bleeding.     OB/GYN History:  , Hx of ovarian cysts, OBC   Sexually active? Yes  Pain with vaginal exams, intercourse or tampon use? Yes, insertion    Bladder/Bowel History:   Frequency of urination:   Daytime: Every 1-2 hours            Nighttime: 0  Difficulty initiating urine stream: No  Urine stream: strong  Complete emptying: Yes  Bladder leakage: No  Urinary Urgency: No    Frequency of bowel movements: once every 1-2 days with Miralax, 2x / week without   Difficulty initiating BM: Yes  Quality/Shape of BM: " [place] Stool Chart Type 4 with Miralax; Type 1-2 without   Does Patient Feel Empty after BM? No  Fiber Supplements or Laxative Use?  Yes  Colon leakage: No    PAIN:  Location: introitus  Current 0/10, worst 10/10, best 0/10   Description: Sharp and Stabbing   Aggravating Factors/Activities that cause symptoms: Vaginal exam/provocation and Inserting tampon    Easing Factors: removing pressure      Medical History: Erika  has a past medical history of Ovarian cyst.     Surgical History:  Erika Beavers  has a past surgical history that includes none.    Medications: Erika has a current medication list which includes the following prescription(s): cetirizine and norethindrone-ethinyl estradiol-iron.    Allergies: Review of patient's allergies indicates:  No Known Allergies     Imaging none    Prior Therapy/Previous treatment included: None   Social History:  lives with roommate  Current exercise: gym 4x/wk, weights and stair stepper  Occupation: Pt is in law school.  Prior Level of Function: pain with exams   Current Level of Function: painful exams, intercourse, tampon use     Types of fluid intake: water 3-4x 36 oz, occasional diet coke, coffee 2 cups daily, alcohol socially   Diet: FODMAP Diet   Habitus:well developed, well nourished  Abuse/Neglect: No     Pts goals: decrease pain with intercourse, exminations    OBJECTIVE     See EMR under MEDIA for written consent provided 9/9/2022  Chaperone: declined    ORTHO SCREEN  Posture in sitting: WNL  Posture in standing: thoracic rotation to the left   Pelvic alignment: no sign of deviations noted in supine   SI Joint Palpation: Denies tenderness to SI joint palpation bilaterally.  Adductor Palpation: increased tension     LUMBAR ROM      ROM Loss   Flexion within functional limits   Extension within functional limits   Side bending Right minimal loss   Side bending Left minimal loss   Rotation Right moderate loss   Rotation Left within functional limits        HIP STRENGTH:      Left      Right  Hip flexion: 4/5 Hip flexion: 4/5   Hip Internal Rotation:  4+/5    Hip Internal Rotation: 4+/5      Hip External Rotation: 4+/5    Hip External Rotation: 4+/5      Hip extension:  4+/5 Hip extension: 4+/5   Hip abduction: 5/5 Hip abduction: 5/5   Hip adduction: 4+/5 Hip adduction 4+/5     Special Tests for Load Transfer Assessment Between the Trunk and Lower Extremities:    Active Straight Leg Test:    Right: WNL   Left: WNL   Single Leg Stance Test:    Right: WFL   Left: WFL     ABDOMINAL WALL ASSESSMENT  Palpation: increased tension   Abdominal strength: Rectus abdominus: 4+/5     Transverse abdominus: 4+/5  Pelvic Floor Muscle and Transverse Abdominus Synergy: not fully assessed   Diastasis: absent      BREATHING MECHANICS ASSESSMENT   Thorax Assessment During Quiet Respiration: WNL excursion of ribcage and WNL excursion of abdominal wall  Thorax Assessment During Deep Respiration: Decreased excursion of abdominal wall , Decreased excursion of lateral ribs, and Excessive excursion of sternum    VAGINAL PELVIC FLOOR EXAM - NP this session        TREATMENT     Treatment Time In: 1315  Treatment Time Out: 1350  Total Treatment time (time-based codes) separate from Evaluation: 35 minutes      Therapeutic Exercise to develop  ROM and flexibility for 5 minutes including:     Review of:   Child's pose   Cat/cow   Cobra pose   LTR   Open books     Neuromuscular Re-education to develop Coordination, Control, and Down training for 10 minutes including: pelvic floor relaxation/bulging training and 360 breathing     Therapeutic Activity Patient participated in dynamic functional therapeutic activities to improve functional performance for 20 minutes. Including: Education as described below.     Patient Education provided:   general anatomy/physiology of urinary/ bowel  system, benefits of treatment, risks of treatment, and alternative methods of treatment were discussed with the pt.  Additionally, anatomy/physiology of pelvic floor, posture/body mechanices, dilator use, diaphragmatic breathing, and behavior modifications were reviewed.     Home Exercises Provided:  Written Home Exercises Provided: yes.  Exercises were reviewed and Erika was able to demonstrate them prior to the end of the session.    Erika demonstrated good  understanding of the education provided.     See EMR under Patient Instructions for exercises provided 9/9/2022.    Assessment     Erika is a 27 y.o. female referred to outpatient Physical Therapy with a medical diagnosis of dyspareunia. Pt presents with altered posture and poor knowledge of body mechanics and posture. Limitations in both trunk range of motion and stability noted, likely secondary to muscular imbalance. Weakness also noted at B hips and abdominals. Fair LE flexibility demonstrated, though abdominal mobility slightly restricted. Coordination of breathing with abdominal mobility also limited. Complete pelvic assessment not performed this session due to reports of pain with prior exams and physical displays of nervousness about examination; however symptoms and prior experience indicate significant pelvic floor tension likely. Together deficits have resulted in significant pain with intercourse, examination and tampon use. Based on presentation, Erika would benefit from continued pelvic floor PT to improve muscle balance of pelvic floor, hips, and trunk and decrease pain with penetration.    Pt prognosis is Good.   Pt will benefit from skilled outpatient Physical Therapy to address the deficits stated above and in the chart below, provide pt/family education, and to maximize pt's level of independence.     Plan of care discussed with patient: Yes  Pt's spiritual, cultural and educational needs considered and patient is agreeable to the plan of care and goals as stated below:       Anticipated Barriers for therapy: none    Medical Necessity is  demonstrated by the following    History  Co-morbidities and personal factors that may impact the plan of care Co-morbidities:   IBS,     Personal Factors:   lifestyle     low   Examination  Body Structures and Functions, activity limitations and participation restrictions that may impact the plan of care Body Regions/Systems/Functions:  altered posture, poor knowledge of body mechanics and posture, poor trunk stability, pelvic floor tenderness, and increased tension of the pelvic muscles     Activity Limitations:  initiating a BM and intercourse/vaginal exam/tampon use without pain    Participation Restrictions:  all ADLs/iADLs uninterrupted by discomfort associated with chronic constipation, well woman's exam, and relationship with spouse/partner    Activity limitations:   Learning and applying knowledge  no deficits    General Tasks and Commands  no deficits    Communication  no deficits    Mobility  no deficits    Self care  no deficits    Domestic Life  no deficits    Interactions/Relationships  intimate relationships    Life Areas  no deficits    Community and Social Life  recreation and leisure       moderate   Clinical Presentation stable and uncomplicated low   Decision Making/ Complexity Score: low       Goals:  Short Term Goals: 5 weeks     Pt to demonstrate proper diaphragmatic breathing to help with calming the nervous system in order to decrease pain and to improve abdominal wall musculature extensibility.   Pt to demonstrate good body mechanics when performing ADLs such as lifting and bending to decrease strain to lumbopelvic structures and reduce risk of further injury.  Pt to report a decrease in pain to no more than 5/10 at it's worst with intercourse.    Pt will report minimal to no pain with single digit pelvic assessment and display relaxation during this assessment in order to progress to dilator use.  Pt will report successful tampon use with < or = 5/10  pain for improved activity tolerance.     Pt to demonstrate proper positioning on commode with breathing techniques to decrease strain with BM to enable pt to feel empty after BM.   Pt to be able to bulge pelvic floor which is needed for comfortable BM and complete evacuation.     Long Term Goals: 10 weeks     Pt to be discharged with home plan for carry over after discharge.   Pt to report being able to have a comfortable vaginal exam without significant increase in pelvic pain.  Pt to report a decrease in pain to no more than 3/10 at it's worst with vaginal intercourse.    Pt will be independent with use of dilation in order to progress towards self management and intercourse.  Pt will report successful tampon use with no pain reported to demonstrate a return towards prior level of function.   Pt to increase hip strength to 5/5 to improve lumbopelvic stability needed to decrease pain.   Pt to report being able to have a spontaneous bowel movement at least once every 2 days to demonstrate improving gut motility and pelvic floor coordination.     Plan     Plan of care Certification: 9/9/2022 to 12/9/2022.    Outpatient Physical Therapy 1 times weekly for 10 weeks to include the following interventions: therapeutic exercises, therapeutic activity, neuromuscular re-education, manual therapy, modalities PRN, patient/family education and self care/home management    Plan for next visit: pelvic examination, review dilator use     MAAME CEVALLOS, PT  9/9/2022          I have seen the patient, reviewed the therapist's plan of care, and I agree with the plan of care.      I certify the need for these services furnished under this plan of treatment and while under my care.     ___________________ ________ Physician/Referring Practitioner            ___________________________ Date of Signature

## 2022-09-30 ENCOUNTER — CLINICAL SUPPORT (OUTPATIENT)
Dept: REHABILITATION | Facility: OTHER | Age: 27
End: 2022-09-30
Attending: OBSTETRICS & GYNECOLOGY
Payer: COMMERCIAL

## 2022-09-30 DIAGNOSIS — M62.89 PELVIC FLOOR TENSION: Primary | ICD-10-CM

## 2022-09-30 DIAGNOSIS — R10.2 PELVIC PAIN: ICD-10-CM

## 2022-09-30 PROCEDURE — 97110 THERAPEUTIC EXERCISES: CPT | Mod: PN

## 2022-09-30 PROCEDURE — 97140 MANUAL THERAPY 1/> REGIONS: CPT | Mod: PN

## 2022-09-30 PROCEDURE — 97112 NEUROMUSCULAR REEDUCATION: CPT | Mod: PN

## 2022-09-30 NOTE — PROGRESS NOTES
"    Pelvic Health Physical Therapy   Treatment Note     Name: Erika Membreno [facility]  Clinic Number: [id number]    Therapy Diagnosis:   Encounter Diagnoses   Name Primary?    Pelvic floor tension Yes    Pelvic pain      Physician: Jazmyn Washington*    Visit Date: 9/30/2022    Physician Orders: PT Eval and Treat - Pelvic Floor PT   Medical Diagnosis from Referral: N94.10 (ICD-10-CM) - Dyspareunia, female  Evaluation Date: 9/9/2022  Authorization Period Expiration: 8/12/2023  Plan of Care Expiration: 12/9/2022  Visit # / Visits authorized: 1/ 1    Cancelled Visits: 0  No Show Visits: 0    Time In: 1105  Time Out: 1205  Total Billable Time: 60 minutes    Precautions: Standard    Subjective     Pt reports: Feels good about the breathing; still feels some stretch. Purchased dilators but has not used      She was compliant with home exercise program.  Response to previous treatment: Good   Functional change: Ongoing     Pain: 0/10  Location:  vaginal canal      Objective     VAGINAL PELVIC FLOOR EXAM    EXTERNAL ASSESSMENT  Introitus: gaping  Skin condition: WNL  Scarring: none   Sensation: WNL   Pain:   4:00 - 8:00   Voluntary contraction: nil  Voluntary relaxation: nil  Involuntary contraction: nil  Bearing down: nil  Perineal descent: absent      INTERNAL ASSESSMENT  Pain: widespread tenderness with palpation   Sensation: able to localized pressure appropriately   Vaginal vault: stenotic   Muscle Bulk: hypertonus   Muscle Power: 1/5     Quality of contraction: slow rise and incomplete relaxation   Specificity: patient contracts: abdominals    Coordination: cannot isolate PFM from abdominals   Prolapse check:CHRISTIANO James received therapeutic exercises to develop  ROM and flexibility for 25 minutes including:     Supine twist 3x15" ea   Cat/cow x10 with breathing   Child's pose <> cobra pose 3x2 breaths   Figure 4 twtist 2x15" ea   Happy baby stretch x30" DL, SL jess James received the following manual therapy " techniques: to develop flexibility, extensibility, and desensitization for 15 minutes including: trigger point/myofascial release of PFM    SIP+ DB/drop to Layer 1/2 PFM     External, Internal    Erika participated in neuromuscular re-education activities to develop Coordination, Control, and Down training for 15 minutes including: pelvic floor relaxation/bulging training, pelvic floor relaxation speed after performing a kegel, 360 breathing , and diaphragmatic breathing    Erika participated in dynamic functional therapeutic activities to improve functional performance for 5  minutes, including:    Dilator use       Home Exercises Provided and Patient Education Provided     Education provided:   - anatomy/physiology of pelvic floor, posture/body mechanices, dilator use, diaphragmatic breathing, and behavior modifications  Discussed progression of plan of care with patient; educated pt in activity modification; reviewed HEP with pt. Pt demonstrated and verbalized understanding of all instruction and was provided with a handout of HEP (see Patient Instructions).    Written Home Exercises Provided: yes.  Exercises were reviewed and Erika was able to demonstrate them prior to the end of the session.  Erika demonstrated good  understanding of the education provided.     See EMR under Patient Instructions for exercises provided 9/30/2022.    Assessment     Pelvic floor assessment performed this session, with Erika demonstrating significant pelvic floor hyperactivity. Pelvic drops reviewed, with success noted with pain relief; however, only minimal changes in pelvic floor tension noted. Most successful relaxation noted following kegel performance, though relaxation remained incomplete.  Based on performance, Erika would benefit from dilator use at home and continued manual release and relaxation training in future sessions.     Erika Is progressing well towards her goals.   Pt prognosis is Good.     Pt will  continue to benefit from skilled outpatient physical therapy to address the deficits listed in the problem list box on initial evaluation, provide pt/family education and to maximize pt's level of independence in the home and community environment.     Pt's spiritual, cultural and educational needs considered and pt agreeable to plan of care and goals.     Anticipated barriers to physical therapy: None    Goals:   Short Term Goals: 5 weeks -progressing      Pt to demonstrate proper diaphragmatic breathing to help with calming the nervous system in order to decrease pain and to improve abdominal wall musculature extensibility.   Pt to demonstrate good body mechanics when performing ADLs such as lifting and bending to decrease strain to lumbopelvic structures and reduce risk of further injury.  Pt to report a decrease in pain to no more than 5/10 at it's worst with intercourse.    Pt will report minimal to no pain with single digit pelvic assessment and display relaxation during this assessment in order to progress to dilator use.  Pt will report successful tampon use with < or = 5/10  pain for improved activity tolerance.    Pt to demonstrate proper positioning on commode with breathing techniques to decrease strain with BM to enable pt to feel empty after BM.   Pt to be able to bulge pelvic floor which is needed for comfortable BM and complete evacuation.      Long Term Goals: 10 weeks -progressing      Pt to be discharged with home plan for carry over after discharge.   Pt to report being able to have a comfortable vaginal exam without significant increase in pelvic pain.  Pt to report a decrease in pain to no more than 3/10 at it's worst with vaginal intercourse.    Pt will be independent with use of dilation in order to progress towards self management and intercourse.  Pt will report successful tampon use with no pain reported to demonstrate a return towards prior level of function.   Pt to increase hip strength  to 5/5 to improve lumbopelvic stability needed to decrease pain.   Pt to report being able to have a spontaneous bowel movement at least once every 2 days to demonstrate improving gut motility and pelvic floor coordination.     Plan     Plan for next visit: resume manual release, stretching + relaxation    MAAME CEVALLOS, PT   09/30/2022

## 2022-10-07 ENCOUNTER — CLINICAL SUPPORT (OUTPATIENT)
Dept: REHABILITATION | Facility: OTHER | Age: 27
End: 2022-10-07
Payer: COMMERCIAL

## 2022-10-07 DIAGNOSIS — M62.89 PELVIC FLOOR TENSION: Primary | ICD-10-CM

## 2022-10-07 DIAGNOSIS — R10.2 PELVIC PAIN: ICD-10-CM

## 2022-10-07 PROCEDURE — 97530 THERAPEUTIC ACTIVITIES: CPT

## 2022-10-07 PROCEDURE — 97110 THERAPEUTIC EXERCISES: CPT

## 2022-10-07 PROCEDURE — 97112 NEUROMUSCULAR REEDUCATION: CPT

## 2022-10-07 NOTE — PROGRESS NOTES
"    Pelvic Health Physical Therapy   Treatment Note     Name: Erika Membreno [facility]  Clinic Number: [id number]    Therapy Diagnosis:   No diagnosis found.    Physician: Jazmyn Washington*    Visit Date: 10/7/2022    Physician Orders: PT Eval and Treat - Pelvic Floor PT   Medical Diagnosis from Referral: N94.10 (ICD-10-CM) - Dyspareunia, female  Evaluation Date: 9/9/2022  Authorization Period Expiration: 8/12/2023  Plan of Care Expiration: 12/9/2022  Visit # / Visits authorized: 2/20    Cancelled Visits: 0  No Show Visits: 0    Time In: 1108  Time Out: 1200  Total Billable Time: 52 minutes    Precautions: Standard    Subjective     Pt reports: Used dilators #3 and was very happy. Has been feeling pinching in R hip, which she thinks may be secondary to increased time walking.     She was compliant with home exercise program.  Response to previous treatment: Good   Functional change: Improved confidence in dilator use     Pain: 0/10  Location:  vaginal canal      Objective         Erika received therapeutic exercises to develop  ROM and flexibility for 30 minutes including:       ITB Stretch 3x20" on R   Figure 4 stretch 3x20" on R  Piriformis stretch 3x20" on R     Supine marching YTB 2x10 ea    At end of mat 3# 2x10 ea     Erika received the following manual therapy techniques: to develop flexibility, extensibility, and desensitization for 00 minutes including: trigger point/myofascial release of PFM    Erika participated in neuromuscular re-education activities to develop Coordination, Control, and Down training for 12 minutes including: pelvic floor relaxation/bulging training, pelvic floor relaxation speed after performing a kegel, and diaphragmatic breathing with Kegel    Performed with emphasis on pelvic floor mobility:   Cat/cow x10 with breathing   Seated PPT x10 with breathing   Wall squat <> Stand 2x10    Hip adduction squeezes 2x10     Erika participated in dynamic functional therapeutic " activities to improve functional performance for 10 minutes, including:    Dilator use       Home Exercises Provided and Patient Education Provided     Education provided:   - anatomy/physiology of pelvic floor, posture/body mechanices, dilator use, diaphragmatic breathing, and behavior modifications  Discussed progression of plan of care with patient; educated pt in activity modification; reviewed HEP with pt. Pt demonstrated and verbalized understanding of all instruction and was provided with a handout of HEP (see Patient Instructions).    Written Home Exercises Provided: yes.  Exercises were reviewed and Erika was able to demonstrate them prior to the end of the session.  Erika demonstrated good  understanding of the education provided.     See EMR under Patient Instructions for exercises provided 9/30/2022.    Assessment     Erika presented with significant improvement in confidence with dilator use. Because of this, use reviewed, but manual therapy not performed. PT to reassess utility of dilators NV. Instead pelvic floor coordination training introduced. Good knowledge of performance and pelvic floor awareness demonstrated; plan to confirm at next reassessment. Due to reported hip pain with walking hip mobility training also reviewed. Posterior stretching introduced and significant improvement in anterior hip pain with resisted flexion reported after use. Based on performance HEP updated to include.    Erika Is progressing well towards her goals.   Pt prognosis is Good.     Pt will continue to benefit from skilled outpatient physical therapy to address the deficits listed in the problem list box on initial evaluation, provide pt/family education and to maximize pt's level of independence in the home and community environment.     Pt's spiritual, cultural and educational needs considered and pt agreeable to plan of care and goals.     Anticipated barriers to physical therapy: None    Goals:   Short  Term Goals: 5 weeks -progressing      Pt to demonstrate proper diaphragmatic breathing to help with calming the nervous system in order to decrease pain and to improve abdominal wall musculature extensibility.   Pt to demonstrate good body mechanics when performing ADLs such as lifting and bending to decrease strain to lumbopelvic structures and reduce risk of further injury.  Pt to report a decrease in pain to no more than 5/10 at it's worst with intercourse.    Pt will report minimal to no pain with single digit pelvic assessment and display relaxation during this assessment in order to progress to dilator use.  Pt will report successful tampon use with < or = 5/10  pain for improved activity tolerance.    Pt to demonstrate proper positioning on commode with breathing techniques to decrease strain with BM to enable pt to feel empty after BM.   Pt to be able to bulge pelvic floor which is needed for comfortable BM and complete evacuation.      Long Term Goals: 10 weeks -progressing      Pt to be discharged with home plan for carry over after discharge.   Pt to report being able to have a comfortable vaginal exam without significant increase in pelvic pain.  Pt to report a decrease in pain to no more than 3/10 at it's worst with vaginal intercourse.    Pt will be independent with use of dilation in order to progress towards self management and intercourse.  Pt will report successful tampon use with no pain reported to demonstrate a return towards prior level of function.   Pt to increase hip strength to 5/5 to improve lumbopelvic stability needed to decrease pain.   Pt to report being able to have a spontaneous bowel movement at least once every 2 days to demonstrate improving gut motility and pelvic floor coordination.     Plan     Plan for next visit: resume manual release, progress hip mobility training.     MAAME CEVALLOS, PT   10/07/2022

## 2022-10-14 ENCOUNTER — CLINICAL SUPPORT (OUTPATIENT)
Dept: REHABILITATION | Facility: OTHER | Age: 27
End: 2022-10-14
Payer: COMMERCIAL

## 2022-10-14 DIAGNOSIS — M62.89 PELVIC FLOOR TENSION: Primary | ICD-10-CM

## 2022-10-14 DIAGNOSIS — R10.2 PELVIC PAIN: ICD-10-CM

## 2022-10-14 PROCEDURE — 97530 THERAPEUTIC ACTIVITIES: CPT

## 2022-10-14 PROCEDURE — 97112 NEUROMUSCULAR REEDUCATION: CPT

## 2022-10-14 NOTE — PROGRESS NOTES
Pelvic Health Physical Therapy   Treatment Note     Name: Erika Membreno [facility]  Clinic Number: [id number]    Therapy Diagnosis:   Encounter Diagnoses   Name Primary?    Pelvic floor tension Yes    Pelvic pain        Physician: Jazmyn Washington*    Visit Date: 10/14/2022    Physician Orders: PT Eval and Treat - Pelvic Floor PT   Medical Diagnosis from Referral: N94.10 (ICD-10-CM) - Dyspareunia, female  Evaluation Date: 9/9/2022  Authorization Period Expiration: 8/12/2023  Plan of Care Expiration: 12/9/2022  Visit # / Visits authorized: 3/20    Cancelled Visits: 0  No Show Visits: 0    Time In: 1110 (pt arrived late)   Time Out: 1200  Total Billable Time: 50 minutes    Precautions: Standard    Subjective     Pt reports: Hip has been much better. Dilator use has progressed to starting with #3 and progressing to #4. #4 remains uncomfortable.     She was compliant with home exercise program.  Response to previous treatment: Good   Functional change: Improved confidence in dilator use     Pain: 0/10  Location:  vaginal canal      Objective     Erika received therapeutic exercises to develop  ROM and flexibility for 00 minutes including:     Erika received the following manual therapy techniques: to develop flexibility, extensibility, and desensitization for 00 minutes including: trigger point/myofascial release of PFM    Erika participated in neuromuscular re-education activities to develop Coordination, Control, and Down training for 40 minutes including: pelvic floor relaxation/bulging training, pelvic floor relaxation speed after performing a kegel, and diaphragmatic breathing with Kegel    Ultrasound - attempted to visualize PFM but unable to fully visualize bladder     Biofeedback via internal rectal probe inserted vaginally    Pelvic floor relaxation in hook lying (hip IR <> ER), long sitting, short sitting, standing   Inhale > exhale & kegel > Inhale and expand > Exhale and rest     Erika  participated in dynamic functional therapeutic activities to improve functional performance for 10 minutes, including:    Dilator use - positioning to consider      Home Exercises Provided and Patient Education Provided     Education provided:   - anatomy/physiology of pelvic floor, posture/body mechanices, dilator use, diaphragmatic breathing, and behavior modifications  Discussed progression of plan of care with patient; educated pt in activity modification; reviewed HEP with pt. Pt demonstrated and verbalized understanding of all instruction and was provided with a handout of HEP (see Patient Instructions).    Written Home Exercises Provided: yes.  Exercises were reviewed and Erika was able to demonstrate them prior to the end of the session.  Erika demonstrated good  understanding of the education provided.     See EMR under Patient Instructions for exercises provided 9/30/2022.    Assessment     Pelvic floor coordination training continued this session, with focus on improved ease of relaxation. Biofeedback utilized for improved visual feedback; improved knowledge of performance demonstrated. Initially, Erika demonstrated bulge with inhale rather than relaxation; however she was able to correct. Despite this, complete relaxation remained difficult, with Erika unable to fully relax pelvic floor prior to end of session. Gentle kegels introduced to aid in pelvic floor awareness during drops; however she remained unable to fully relax. In addition, during relaxation, small sharp spike in activity noted prior during relaxation. Positional changes did not change frequency of occurrence, though more relaxed, less intense breathing did decrease frequency slightly. Based on performance, PT to utilize ultrasound NV for improved visualization of pelvic floor mobility.     Erika Is progressing well towards her goals.   Pt prognosis is Good.     Pt will continue to benefit from skilled outpatient physical therapy  to address the deficits listed in the problem list box on initial evaluation, provide pt/family education and to maximize pt's level of independence in the home and community environment.     Pt's spiritual, cultural and educational needs considered and pt agreeable to plan of care and goals.     Anticipated barriers to physical therapy: None    Goals:   Short Term Goals: 5 weeks -progressing      Pt to demonstrate proper diaphragmatic breathing to help with calming the nervous system in order to decrease pain and to improve abdominal wall musculature extensibility.   Pt to demonstrate good body mechanics when performing ADLs such as lifting and bending to decrease strain to lumbopelvic structures and reduce risk of further injury.  Pt to report a decrease in pain to no more than 5/10 at it's worst with intercourse.    Pt will report minimal to no pain with single digit pelvic assessment and display relaxation during this assessment in order to progress to dilator use.  Pt will report successful tampon use with < or = 5/10  pain for improved activity tolerance.    Pt to demonstrate proper positioning on commode with breathing techniques to decrease strain with BM to enable pt to feel empty after BM.   Pt to be able to bulge pelvic floor which is needed for comfortable BM and complete evacuation.      Long Term Goals: 10 weeks -progressing      Pt to be discharged with home plan for carry over after discharge.   Pt to report being able to have a comfortable vaginal exam without significant increase in pelvic pain.  Pt to report a decrease in pain to no more than 3/10 at it's worst with vaginal intercourse.    Pt will be independent with use of dilation in order to progress towards self management and intercourse.  Pt will report successful tampon use with no pain reported to demonstrate a return towards prior level of function.   Pt to increase hip strength to 5/5 to improve lumbopelvic stability needed to decrease  pain.   Pt to report being able to have a spontaneous bowel movement at least once every 2 days to demonstrate improving gut motility and pelvic floor coordination.     Plan     Plan for next visit: ultrasound, resume PFM KAELA CEVALLOS, PT   10/14/2022

## 2022-10-28 ENCOUNTER — CLINICAL SUPPORT (OUTPATIENT)
Dept: REHABILITATION | Facility: OTHER | Age: 27
End: 2022-10-28
Payer: COMMERCIAL

## 2022-10-28 DIAGNOSIS — M62.89 PELVIC FLOOR TENSION: Primary | ICD-10-CM

## 2022-10-28 DIAGNOSIS — R10.2 PELVIC PAIN: ICD-10-CM

## 2022-10-28 PROCEDURE — 97112 NEUROMUSCULAR REEDUCATION: CPT

## 2022-10-28 PROCEDURE — 97530 THERAPEUTIC ACTIVITIES: CPT

## 2022-10-28 NOTE — PROGRESS NOTES
Pelvic Health Physical Therapy   Treatment Note     Name: Erika Membreno [facility]  Clinic Number: [id number]    Therapy Diagnosis:   Encounter Diagnoses   Name Primary?    Pelvic floor tension Yes    Pelvic pain        Physician: Jazmyn Washington*    Visit Date: 10/28/2022    Physician Orders: PT Eval and Treat - Pelvic Floor PT   Medical Diagnosis from Referral: N94.10 (ICD-10-CM) - Dyspareunia, female  Evaluation Date: 9/9/2022  Authorization Period Expiration: 8/12/2023  Plan of Care Expiration: 12/9/2022  Visit # / Visits authorized: 4/20    Cancelled Visits: 0  No Show Visits: 0    Time In: 1105  Time Out: 1150  Total Billable Time: 45 minutes    Precautions: Standard    Subjective     Pt reports: Feels like she is doing very well. She is starting with #4, and is using #5 pretty regularly. She is able to use #4 in dilator in child's pose without discomfort.     She was compliant with home exercise program.  Response to previous treatment: Good   Functional change: Improved confidence in dilator use     Pain: 0/10  Location:  vaginal canal      Objective     VAGINAL PELVIC FLOOR EXAM    INTERNAL ASSESSMENT  Pain: tender areas noted as follows: transverse perineals, posterolateral LA   Sensation: able to localized pressure appropriately   Vaginal vault: WNL   Muscle Bulk: WNL   Muscle Power: 2/5    Quality of contraction: slow rise   Specificity: patient contracts: abdominals   Coordination: tends to hold breath during PFM contration and cannot isolate PFM from abdominals   Prolapse check:none     Erika received therapeutic exercises to develop  ROM and flexibility for 00 minutes including:     Erika received the following manual therapy techniques: to develop flexibility, extensibility, and desensitization for 00 minutes including: trigger point/myofascial release of PFM    Erika participated in neuromuscular re-education activities to develop Coordination, Control, and Down training for 35  minutes  including: pelvic floor relaxation/bulging training, pelvic floor relaxation speed after performing a kegel, and diaphragmatic breathing with Kegel    Ultrasound   Pelvic drops with diaphragmatic breathing    - decreasing abdominal activation with breathing   Kegels attempted, but unable to fully recruit    With tactile stimulation   Pelvic drops using imagery to lay an egg from rectum   Kegel using imagery to pull finger into vaginal canal     Erika participated in dynamic functional therapeutic activities to improve functional performance for 10 minutes, including:    Dilator use - positioning to consider      Home Exercises Provided and Patient Education Provided     Education provided:   - anatomy/physiology of pelvic floor, posture/body mechanices, dilator use, diaphragmatic breathing, and behavior modifications  Discussed progression of plan of care with patient; educated pt in activity modification; reviewed HEP with pt. Pt demonstrated and verbalized understanding of all instruction and was provided with a handout of HEP (see Patient Instructions).    Written Home Exercises Provided: yes.  Exercises were reviewed and Erika was able to demonstrate them prior to the end of the session.  Erika demonstrated good  understanding of the education provided.     See EMR under Patient Instructions for exercises provided 9/30/2022.    Assessment     Pelvic floor reassessment revealed significant improvements in pelvic floor tension and control. Imagery during pelvic drops reviewed, with improved mobility demonstrated with visualization of opening vaginal rather than dropping. Good mobility also noted wihen imaging laying an egg from rectum. Poor pelvic floor recruitment noted, so kegel drops also added this session with use of tactile stimulation; Erika to continue with practice at home using dilators. PT to review dilator program again NVDario James Is progressing well towards her goals.   Pt prognosis is  Good.     Pt will continue to benefit from skilled outpatient physical therapy to address the deficits listed in the problem list box on initial evaluation, provide pt/family education and to maximize pt's level of independence in the home and community environment.     Pt's spiritual, cultural and educational needs considered and pt agreeable to plan of care and goals.     Anticipated barriers to physical therapy: None    Goals:   Short Term Goals: 5 weeks -progressing      Pt to demonstrate proper diaphragmatic breathing to help with calming the nervous system in order to decrease pain and to improve abdominal wall musculature extensibility. - MET 10/28/2022  Pt to demonstrate good body mechanics when performing ADLs such as lifting and bending to decrease strain to lumbopelvic structures and reduce risk of further injury.  Pt to report a decrease in pain to no more than 5/10 at it's worst with intercourse.    Pt will report minimal to no pain with single digit pelvic assessment and display relaxation during this assessment in order to progress to dilator use.  Pt will report successful tampon use with < or = 5/10  pain for improved activity tolerance.    Pt to demonstrate proper positioning on commode with breathing techniques to decrease strain with BM to enable pt to feel empty after BM.   Pt to be able to bulge pelvic floor which is needed for comfortable BM and complete evacuation. - MET 10/28/2022     Long Term Goals: 10 weeks -progressing      Pt to be discharged with home plan for carry over after discharge.   Pt to report being able to have a comfortable vaginal exam without significant increase in pelvic pain.  Pt to report a decrease in pain to no more than 3/10 at it's worst with vaginal intercourse.    Pt will be independent with use of dilation in order to progress towards self management and intercourse. - MET 10/28/2022  Pt will report successful tampon use with no pain reported to demonstrate a  return towards prior level of function.   Pt to increase hip strength to 5/5 to improve lumbopelvic stability needed to decrease pain.   Pt to report being able to have a spontaneous bowel movement at least once every 2 days to demonstrate improving gut motility and pelvic floor coordination.     Plan     Plan for next visit: resume PFM MT, review drops and kegel <> drops, functional reassessment, MARCUS CEVALLOS, PT   10/28/2022

## 2022-11-11 ENCOUNTER — CLINICAL SUPPORT (OUTPATIENT)
Dept: REHABILITATION | Facility: OTHER | Age: 27
End: 2022-11-11
Attending: INTERNAL MEDICINE
Payer: COMMERCIAL

## 2022-11-11 DIAGNOSIS — M62.89 PELVIC FLOOR TENSION: Primary | ICD-10-CM

## 2022-11-11 DIAGNOSIS — R10.2 PELVIC PAIN: ICD-10-CM

## 2022-11-11 PROCEDURE — 97112 NEUROMUSCULAR REEDUCATION: CPT

## 2022-11-11 PROCEDURE — 97530 THERAPEUTIC ACTIVITIES: CPT

## 2022-11-11 PROCEDURE — 97140 MANUAL THERAPY 1/> REGIONS: CPT

## 2022-11-11 NOTE — PROGRESS NOTES
Pelvic Health Physical Therapy   Treatment Note     Name: Erika Membreno [facility]  Clinic Number: [id number]    Therapy Diagnosis:   Encounter Diagnoses   Name Primary?    Pelvic floor tension Yes    Pelvic pain        Physician: Jazmyn Washington*    Visit Date: 11/11/2022    Physician Orders: PT Eval and Treat - Pelvic Floor PT   Medical Diagnosis from Referral: N94.10 (ICD-10-CM) - Dyspareunia, female  Evaluation Date: 9/9/2022  Authorization Period Expiration: 8/12/2023  Plan of Care Expiration: 12/9/2022  Visit # / Visits authorized: 6/20    Cancelled Visits: 0  No Show Visits: 0    Time In: 1010  Time Out: 1058  Total Billable Time: 48 minutes    Precautions: Standard    Subjective     Pt reports: She is now using dilator #6 She is starting with #5 and working on comfort with movement. She has also been working on controlling contraction and relaxation. Can now perform kegels/drops without sensation of pinching. She can feel that layer 3 muscles are more relaxed, but layers 1/2 remain slightly tight. Only really experiences pain with insertion; pain rated at 4-5/10.    She was compliant with home exercise program.  Response to previous treatment: Good   Functional change: Improved confidence in dilator use     Pain: 0/10  Location:  vaginal canal      Objective       Erika received therapeutic exercises to develop  ROM and flexibility for 00 minutes including:     Erika received the following manual therapy techniques: to develop flexibility, extensibility, and desensitization for 10 minutes including: trigger point/myofascial release of PFM    SIP + DB, focus on layers 1/2   with pelvic drops and kegel > drops     Erika participated in neuromuscular re-education activities to develop Coordination, Control, and Down training for 28 minutes including: pelvic floor relaxation/bulging training, pelvic floor relaxation speed after performing a kegel, and diaphragmatic breathing with Kegel    Pelvic  floor drops - complete vs layer specific focus (bee 1/2)    + Posterolateral focus - imagery to lay egg   Pelvic floor elevators + coordinated breathing     Erika participated in dynamic functional therapeutic activities to improve functional performance for 10 minutes, including:    Dilator use - methods of use for superficial focus       Home Exercises Provided and Patient Education Provided     Education provided:   - anatomy/physiology of pelvic floor, posture/body mechanices, dilator use, diaphragmatic breathing, and behavior modifications  Discussed progression of plan of care with patient; educated pt in activity modification; reviewed HEP with pt. Pt demonstrated and verbalized understanding of all instruction and was provided with a handout of HEP (see Patient Instructions).    Written Home Exercises Provided: yes.  Exercises were reviewed and Erika was able to demonstrate them prior to the end of the session.  Erika demonstrated good  understanding of the education provided.     See EMR under Patient Instructions for exercises provided 9/30/2022.    Assessment     Erika performed well in today's session. Pelvic floor mobility and coordination training continued this session, with Erika demonstrating continued improvement. Limited volitional pelvic floor recruitment demonstrated in previous session, though significant improvement noted this session after continued practice. Based on performance, PT to reassess LISA James Is progressing well towards her goals.   Pt prognosis is Good.     Pt will continue to benefit from skilled outpatient physical therapy to address the deficits listed in the problem list box on initial evaluation, provide pt/family education and to maximize pt's level of independence in the home and community environment.     Pt's spiritual, cultural and educational needs considered and pt agreeable to plan of care and goals.     Anticipated barriers to physical therapy:  None    Goals:   Short Term Goals: 5 weeks -progressing      Pt to demonstrate proper diaphragmatic breathing to help with calming the nervous system in order to decrease pain and to improve abdominal wall musculature extensibility. - MET 10/28/2022  Pt to demonstrate good body mechanics when performing ADLs such as lifting and bending to decrease strain to lumbopelvic structures and reduce risk of further injury.  Pt to report a decrease in pain to no more than 5/10 at it's worst with intercourse.    Pt will report minimal to no pain with single digit pelvic assessment and display relaxation during this assessment in order to progress to dilator use.  Pt will report successful tampon use with < or = 5/10  pain for improved activity tolerance.    Pt to demonstrate proper positioning on commode with breathing techniques to decrease strain with BM to enable pt to feel empty after BM.   Pt to be able to bulge pelvic floor which is needed for comfortable BM and complete evacuation. - MET 10/28/2022     Long Term Goals: 10 weeks -progressing      Pt to be discharged with home plan for carry over after discharge.   Pt to report being able to have a comfortable vaginal exam without significant increase in pelvic pain.  Pt to report a decrease in pain to no more than 3/10 at it's worst with vaginal intercourse.    Pt will be independent with use of dilation in order to progress towards self management and intercourse. - MET 10/28/2022  Pt will report successful tampon use with no pain reported to demonstrate a return towards prior level of function.   Pt to increase hip strength to 5/5 to improve lumbopelvic stability needed to decrease pain.   Pt to report being able to have a spontaneous bowel movement at least once every 2 days to demonstrate improving gut motility and pelvic floor coordination.     Plan     Plan for next visit: resume PFM MT, review drops and kegel <> drops, functional reassessment    MAAME CEVALLOS,  PT   11/11/2022

## 2022-11-14 ENCOUNTER — CLINICAL SUPPORT (OUTPATIENT)
Dept: REHABILITATION | Facility: OTHER | Age: 27
End: 2022-11-14
Attending: OBSTETRICS & GYNECOLOGY
Payer: COMMERCIAL

## 2022-11-14 DIAGNOSIS — R10.2 PELVIC PAIN: ICD-10-CM

## 2022-11-14 DIAGNOSIS — M62.89 PELVIC FLOOR TENSION: Primary | ICD-10-CM

## 2022-11-14 PROCEDURE — 97530 THERAPEUTIC ACTIVITIES: CPT

## 2022-11-14 PROCEDURE — 97112 NEUROMUSCULAR REEDUCATION: CPT

## 2022-11-14 PROCEDURE — 97140 MANUAL THERAPY 1/> REGIONS: CPT

## 2022-11-14 NOTE — PROGRESS NOTES
Pelvic Health Physical Therapy   Treatment Note     Name: Erika Membreno [facility]  Clinic Number: [id number]    Therapy Diagnosis:   Encounter Diagnoses   Name Primary?    Pelvic floor tension Yes    Pelvic pain        Physician: Jazmyn Washington*    Visit Date: 11/14/2022    Physician Orders: PT Eval and Treat - Pelvic Floor PT   Medical Diagnosis from Referral: N94.10 (ICD-10-CM) - Dyspareunia, female  Evaluation Date: 9/9/2022  Authorization Period Expiration: 8/12/2023  Plan of Care Expiration: 12/9/2022  Visit # / Visits authorized: 6/20    Cancelled Visits: 0  No Show Visits: 0    Time In: 1303  Time Out: 1405  Total Billable Time: 62  minutes    Precautions: Standard    Subjective     Pt reports: Yesterday, she attempted to use more pointed pressure for better layer 1/2 relaxation. Felt confident about this.     She was compliant with home exercise program.  Response to previous treatment: Good   Functional change: Improved confidence in dilator use     Pain: 0/10  Location:  vaginal canal      Objective     Erika received therapeutic exercises to develop  ROM and flexibility for 00 minutes including:     Erika received the following manual therapy techniques: to develop flexibility, extensibility, and desensitization for 15 minutes including: trigger point/myofascial release of PFM    SIP + DB/drop, pelvic elevators into drops     Erika participated in neuromuscular re-education activities to develop Coordination, Control, and Down training for 37 minutes including: pelvic floor relaxation/bulging training, pelvic floor relaxation speed after performing a kegel, and diaphragmatic breathing with Kegel    Pelvic drops - complete vs layer specific     Dilator use reviewed:    Pelvic floor drops + SIP, sweeping of dilator #3, #6   Pelvic floor elevators around dilator #6     Erika participated in dynamic functional therapeutic activities to improve functional performance for 10 minutes,  including:    Dilator use - methods of use       Home Exercises Provided and Patient Education Provided     Education provided:   - anatomy/physiology of pelvic floor, posture/body mechanices, dilator use, diaphragmatic breathing, and behavior modifications  Discussed progression of plan of care with patient; educated pt in activity modification; reviewed HEP with pt. Pt demonstrated and verbalized understanding of all instruction and was provided with a handout of HEP (see Patient Instructions).    Written Home Exercises Provided: yes.  Exercises were reviewed and Erika was able to demonstrate them prior to the end of the session.  Erika demonstrated good  understanding of the education provided.     See EMR under Patient Instructions for exercises provided 9/30/2022.    Assessment     Dilator work reviewed once again this session, with Erika demonstrating significant improvement in pelvic floor relaxation and control. She is now able to use dilator #6 fairly comfortably and confidently. Today, specific focus given to use of dilator to target layer 1 and 2 musculature for more comfortable insertion. No significant difference reported with use of small vs large dilator for application of superficial SIP, though improved tolerance of sweeping noted with smaller dilator. Based on performance, Erika would benefit from continued use of dilator training with focus on tolerance of motion in preparation for intercourse.     Erika Is progressing well towards her goals.   Pt prognosis is Good.     Pt will continue to benefit from skilled outpatient physical therapy to address the deficits listed in the problem list box on initial evaluation, provide pt/family education and to maximize pt's level of independence in the home and community environment.     Pt's spiritual, cultural and educational needs considered and pt agreeable to plan of care and goals.     Anticipated barriers to physical therapy: None    Goals:    Short Term Goals: 5 weeks -progressing      Pt to demonstrate proper diaphragmatic breathing to help with calming the nervous system in order to decrease pain and to improve abdominal wall musculature extensibility. - MET 10/28/2022  Pt to demonstrate good body mechanics when performing ADLs such as lifting and bending to decrease strain to lumbopelvic structures and reduce risk of further injury.  Pt to report a decrease in pain to no more than 5/10 at it's worst with intercourse.    Pt will report minimal to no pain with single digit pelvic assessment and display relaxation during this assessment in order to progress to dilator use.  Pt will report successful tampon use with < or = 5/10  pain for improved activity tolerance.    Pt to demonstrate proper positioning on commode with breathing techniques to decrease strain with BM to enable pt to feel empty after BM.   Pt to be able to bulge pelvic floor which is needed for comfortable BM and complete evacuation. - MET 10/28/2022     Long Term Goals: 10 weeks -progressing      Pt to be discharged with home plan for carry over after discharge.   Pt to report being able to have a comfortable vaginal exam without significant increase in pelvic pain.  Pt to report a decrease in pain to no more than 3/10 at it's worst with vaginal intercourse.    Pt will be independent with use of dilation in order to progress towards self management and intercourse. - MET 10/28/2022  Pt will report successful tampon use with no pain reported to demonstrate a return towards prior level of function.   Pt to increase hip strength to 5/5 to improve lumbopelvic stability needed to decrease pain.   Pt to report being able to have a spontaneous bowel movement at least once every 2 days to demonstrate improving gut motility and pelvic floor coordination.     Plan     Plan for next visit: review pelvic floor relaxation with motion      MAAME CEVALLOS, PT   11/14/2022

## 2022-11-21 ENCOUNTER — CLINICAL SUPPORT (OUTPATIENT)
Dept: REHABILITATION | Facility: OTHER | Age: 27
End: 2022-11-21
Attending: OBSTETRICS & GYNECOLOGY
Payer: COMMERCIAL

## 2022-11-21 DIAGNOSIS — R10.2 PELVIC PAIN: ICD-10-CM

## 2022-11-21 DIAGNOSIS — M62.89 PELVIC FLOOR TENSION: Primary | ICD-10-CM

## 2022-11-21 PROCEDURE — 97140 MANUAL THERAPY 1/> REGIONS: CPT

## 2022-11-21 PROCEDURE — 97530 THERAPEUTIC ACTIVITIES: CPT

## 2022-11-21 PROCEDURE — 97112 NEUROMUSCULAR REEDUCATION: CPT

## 2022-11-21 NOTE — PROGRESS NOTES
Pelvic Health Physical Therapy   Treatment Note     Name: Erika Beavers  Clinic Number: [id number]    Therapy Diagnosis:   Encounter Diagnoses   Name Primary?    Pelvic floor tension Yes    Pelvic pain      Physician: Jazmyn Washington*    Visit Date: 11/21/2022    Physician Orders: PT Eval and Treat - Pelvic Floor PT   Medical Diagnosis from Referral: N94.10 (ICD-10-CM) - Dyspareunia, female  Evaluation Date: 9/9/2022  Authorization Period Expiration: 8/12/2023  Plan of Care Expiration: 12/9/2022  Visit # / Visits authorized: 7/20    Cancelled Visits: 0  No Show Visits: 0    Time In: 1305  Time Out: 1450  Total Billable Time: 45 minutes    Precautions: Standard    Subjective     Pt reports: Decreased pain at opening, even with dilator #6. The only thing that she has noticed, has been increased pressure at middle layer, surrounding opening.     She was compliant with home exercise program.  Response to previous treatment: Good   Functional change: Improved confidence in dilator use     Pain: 0/10  Location:  vaginal canal      Objective     Erika received therapeutic exercises to develop  ROM and flexibility for 00 minutes including:     Erika received the following manual therapy techniques: to develop flexibility, extensibility, and desensitization for 10 minutes including: trigger point/myofascial release of PFM    SIP + DB/drop - focus on posterior vaginal canal     Erika participated in neuromuscular re-education activities to develop Coordination, Control, and Down training for 20 minutes including: pelvic floor relaxation/bulging training, pelvic floor relaxation speed after performing a kegel, and diaphragmatic breathing with Kegel    Pelvic drops - emphasis on posterior vaginal canal    Imagery to lay egg, relax around rectum     Dilator use reviewed:    Insertion + SKTC, DKTC + drops     Erika participated in dynamic functional therapeutic activities to improve functional  performance for 15 minutes, including:    Dilator use - methods of use       Home Exercises Provided and Patient Education Provided     Education provided:   - anatomy/physiology of pelvic floor, posture/body mechanices, dilator use, diaphragmatic breathing, and behavior modifications  Discussed progression of plan of care with patient; educated pt in activity modification; reviewed HEP with pt. Pt demonstrated and verbalized understanding of all instruction and was provided with a handout of HEP (see Patient Instructions).    Written Home Exercises Provided: yes.  Exercises were reviewed and Erika was able to demonstrate them prior to the end of the session.  Erika demonstrated good  understanding of the education provided.     See EMR under Patient Instructions for exercises provided 9/30/2022.    Assessment     Dilator work reviewed, with emphasis on concentrated relaxation of layer 2 and 3. Slight improvement in relaxation noted with use of egg laying imagery, though most improvement noted with hip flexion. After performance of relaxation in this position, entrance and exit of dilator #6 completed with ease. Based on performance, Erika to update dilator routine to include. PT to reassess NV.    Erika Is progressing well towards her goals.   Pt prognosis is Good.     Pt will continue to benefit from skilled outpatient physical therapy to address the deficits listed in the problem list box on initial evaluation, provide pt/family education and to maximize pt's level of independence in the home and community environment.     Pt's spiritual, cultural and educational needs considered and pt agreeable to plan of care and goals.     Anticipated barriers to physical therapy: None    Goals:   Short Term Goals: 5 weeks -progressing      Pt to demonstrate proper diaphragmatic breathing to help with calming the nervous system in order to decrease pain and to improve abdominal wall musculature extensibility. - MET  10/28/2022  Pt to demonstrate good body mechanics when performing ADLs such as lifting and bending to decrease strain to lumbopelvic structures and reduce risk of further injury.  Pt to report a decrease in pain to no more than 5/10 at it's worst with intercourse.    Pt will report minimal to no pain with single digit pelvic assessment and display relaxation during this assessment in order to progress to dilator use.  Pt will report successful tampon use with < or = 5/10  pain for improved activity tolerance.    Pt to demonstrate proper positioning on commode with breathing techniques to decrease strain with BM to enable pt to feel empty after BM.   Pt to be able to bulge pelvic floor which is needed for comfortable BM and complete evacuation. - MET 10/28/2022     Long Term Goals: 10 weeks -progressing      Pt to be discharged with home plan for carry over after discharge.   Pt to report being able to have a comfortable vaginal exam without significant increase in pelvic pain.  Pt to report a decrease in pain to no more than 3/10 at it's worst with vaginal intercourse.    Pt will be independent with use of dilation in order to progress towards self management and intercourse. - MET 10/28/2022  Pt will report successful tampon use with no pain reported to demonstrate a return towards prior level of function.   Pt to increase hip strength to 5/5 to improve lumbopelvic stability needed to decrease pain.   Pt to report being able to have a spontaneous bowel movement at least once every 2 days to demonstrate improving gut motility and pelvic floor coordination.     Plan     Plan for next visit: reassessment       MAAME CEVALLOS, PT   11/21/2022

## 2022-11-29 ENCOUNTER — PATIENT MESSAGE (OUTPATIENT)
Dept: OBSTETRICS AND GYNECOLOGY | Facility: CLINIC | Age: 27
End: 2022-11-29
Payer: COMMERCIAL

## 2022-11-29 RX ORDER — NITROFURANTOIN 25; 75 MG/1; MG/1
100 CAPSULE ORAL 2 TIMES DAILY
Qty: 6 CAPSULE | Refills: 0 | Status: SHIPPED | OUTPATIENT
Start: 2022-11-29 | End: 2022-12-02

## 2022-12-08 ENCOUNTER — CLINICAL SUPPORT (OUTPATIENT)
Dept: REHABILITATION | Facility: OTHER | Age: 27
End: 2022-12-08
Attending: OBSTETRICS & GYNECOLOGY
Payer: COMMERCIAL

## 2022-12-08 DIAGNOSIS — M62.89 PELVIC FLOOR TENSION: Primary | ICD-10-CM

## 2022-12-08 DIAGNOSIS — R10.2 PELVIC PAIN: ICD-10-CM

## 2022-12-08 PROCEDURE — 97112 NEUROMUSCULAR REEDUCATION: CPT

## 2022-12-08 PROCEDURE — 97140 MANUAL THERAPY 1/> REGIONS: CPT

## 2022-12-08 NOTE — PROGRESS NOTES
Pelvic Health Physical Therapy   Treatment Note     Name: Erika Membreno [facility]  Clinic Number: [id number]    Therapy Diagnosis:   Encounter Diagnoses   Name Primary?    Pelvic floor tension Yes    Pelvic pain      Physician: Jazmyn Washington*    Visit Date: 12/8/2022    Physician Orders: PT Eval and Treat - Pelvic Floor PT   Medical Diagnosis from Referral: N94.10 (ICD-10-CM) - Dyspareunia, female  Evaluation Date: 9/9/2022  Authorization Period Expiration: 8/12/2023  Plan of Care Expiration: 12/9/2022  Visit # / Visits authorized: 8/20    Cancelled Visits: 0  No Show Visits: 0    Time In: 0800  Time Out: 0855  Total Billable Time: 55 minutes    Precautions: Standard    Subjective     Pt reports: Resumed sex with 4-5/10 pain upon initial insertion. Feels closer to the surface. Once her boyfriend has entered fully and tissue sensitivity decreases, she is able to continue without pain. He is even able to exit and re-enter when they are repositioning without difficulty or sensitivity. Does not feel as if lubrication is a problem; has experienced same pain with and without lubricant use.     She was compliant with home exercise program.  Response to previous treatment: Good   Functional change: Resumed sexual intercourse     Pain: 0/10  Location:  vaginal canal      Objective     Erika received therapeutic exercises to develop  ROM and flexibility for 00 minutes including:     Erika received the following manual therapy techniques: to develop flexibility, extensibility, and desensitization for 25 minutes including: trigger point/myofascial release of PFM    SIP + DB/drop - focus on transverse perineal     Erika participated in neuromuscular re-education activities to develop Coordination, Control, and Down training for 30 minutes including: pelvic floor relaxation/bulging training, pelvic floor relaxation speed after performing a kegel, and diaphragmatic breathing with Kegel    Pelvic drops - emphasis on  relax around rectum    + SKTC in supine, SL     Erika participated in dynamic functional therapeutic activities to improve functional performance for 00 minutes, including:      Home Exercises Provided and Patient Education Provided     Education provided:   - anatomy/physiology of pelvic floor, posture/body mechanices, dilator use, diaphragmatic breathing, and behavior modifications  Discussed progression of plan of care with patient; educated pt in activity modification; reviewed HEP with pt. Pt demonstrated and verbalized understanding of all instruction and was provided with a handout of HEP (see Patient Instructions).    Written Home Exercises Provided: yes.  Exercises were reviewed and Erika was able to demonstrate them prior to the end of the session.  Erika demonstrated good  understanding of the education provided.     See EMR under Patient Instructions for exercises provided 9/30/2022.    Assessment     Manual release of superficial transverse perineal performed this session, as this remains the most bothersome muscle for Erika during intercourse. She performed well with drops; most success noted with imagery to open anus. Single knee to chest also aided in relaxation. Based on performance, Erika to add both to dilator training at home.     Erika Is progressing well towards her goals.   Pt prognosis is Good.     Pt will continue to benefit from skilled outpatient physical therapy to address the deficits listed in the problem list box on initial evaluation, provide pt/family education and to maximize pt's level of independence in the home and community environment.     Pt's spiritual, cultural and educational needs considered and pt agreeable to plan of care and goals.     Anticipated barriers to physical therapy: None    Goals:   Short Term Goals: 5 weeks -progressing      Pt to demonstrate proper diaphragmatic breathing to help with calming the nervous system in order to decrease pain and to  improve abdominal wall musculature extensibility. - MET 10/28/2022  Pt to demonstrate good body mechanics when performing ADLs such as lifting and bending to decrease strain to lumbopelvic structures and reduce risk of further injury. - MET 12/8/2022  Pt to report a decrease in pain to no more than 5/10 at it's worst with intercourse.  - MET 12/8/2022  Pt will report minimal to no pain with single digit pelvic assessment and display relaxation during this assessment in order to progress to dilator use. - MET 12/8/2022  Pt will report successful tampon use with < or = 5/10  pain for improved activity tolerance.  - MET 12/8/2022  Pt to demonstrate proper positioning on commode with breathing techniques to decrease strain with BM to enable pt to feel empty after BM. - MET 12/8/2022  Pt to be able to bulge pelvic floor which is needed for comfortable BM and complete evacuation. - MET 10/28/2022     Long Term Goals: 10 weeks -progressing      Pt t to report being able to have a comfortable vaginal exam without significant increase in pelvic pain.  Pt to report a decrease in pain to no more than 3/10 at it's worst with vaginal intercourse.    Pt will be independent with use of dilation in order to progress towards self management and intercourse. - MET 10/28/2022  Pt will report successful tampon use with no pain reported to demonstrate a return towards prior level of function. - MET 12/8/2022  Pt to increase hip strength to 5/5 to improve lumbopelvic stability needed to decrease pain.   Pt to report being able to have a spontaneous bowel movement at least once every 2 days to demonstrate improving gut motility and pelvic floor coordination. - MET 12/8/2022    Plan     Plan for next visit: review dilator training    MAAME CEVALLOS, PT   12/08/2022

## 2022-12-19 ENCOUNTER — CLINICAL SUPPORT (OUTPATIENT)
Dept: REHABILITATION | Facility: OTHER | Age: 27
End: 2022-12-19
Attending: OBSTETRICS & GYNECOLOGY
Payer: COMMERCIAL

## 2022-12-19 DIAGNOSIS — R10.2 PELVIC PAIN: ICD-10-CM

## 2022-12-19 DIAGNOSIS — M62.89 PELVIC FLOOR TENSION: Primary | ICD-10-CM

## 2022-12-19 PROCEDURE — 97112 NEUROMUSCULAR REEDUCATION: CPT

## 2022-12-19 PROCEDURE — 97140 MANUAL THERAPY 1/> REGIONS: CPT

## 2022-12-19 PROCEDURE — 97530 THERAPEUTIC ACTIVITIES: CPT

## 2022-12-19 NOTE — PROGRESS NOTES
Pelvic Health Physical Therapy   Treatment Note     Name: Erika Membreno [facility]  Clinic Number: [id number]    Therapy Diagnosis:   Encounter Diagnoses   Name Primary?    Pelvic floor tension Yes    Pelvic pain        Physician: Jazmyn Washington*    Visit Date: 12/19/2022    Physician Orders: PT Eval and Treat - Pelvic Floor PT   Medical Diagnosis from Referral: N94.10 (ICD-10-CM) - Dyspareunia, female  Evaluation Date: 9/9/2022  Authorization Period Expiration: 8/12/2023  Plan of Care Expiration: 12/9/2022  Visit # / Visits authorized: 8/20    Cancelled Visits: 02  No Show Visits: 0    Time In: 0803  Time Out: 38454  Total Billable Time: 50 minutes    Precautions: Standard    Subjective     Pt reports: Feels good about the dilator use; however continues to feel pinching. She does not only feel in the opening now; she feels more around layer 2. Remains around 5/10. Partner was able to feel increased muscle tone on L side.     She was compliant with home exercise program.  Response to previous treatment: Good   Functional change: Resumed sexual intercourse     Pain: 0/10  Location:  vaginal canal      Objective     Erika received therapeutic exercises to develop  ROM and flexibility for 00 minutes including:     Erika received the following manual therapy techniques: to develop flexibility, extensibility, and desensitization for 25 minutes including: trigger point/myofascial release of PFM    SIP + DB/drop - focus on transverse perineals, puborectalis     Coccyx mobilization - in side lying, quadruped    - self performance     Erika participated in neuromuscular re-education activities to develop Coordination, Control, and Down training for 15 minutes including: pelvic floor relaxation/bulging training, pelvic floor relaxation speed after performing a kegel, and diaphragmatic breathing with Kegel    Pelvic drops - emphasis on relax around rectum     Erika participated in dynamic functional therapeutic  activities to improve functional performance for 10 minutes, including:    Dilator use reviewed    Home Exercises Provided and Patient Education Provided     Education provided:   - anatomy/physiology of pelvic floor, posture/body mechanices, dilator use, diaphragmatic breathing, and behavior modifications  Discussed progression of plan of care with patient; educated pt in activity modification; reviewed HEP with pt. Pt demonstrated and verbalized understanding of all instruction and was provided with a handout of HEP (see Patient Instructions).    Written Home Exercises Provided: yes.  Exercises were reviewed and Erika was able to demonstrate them prior to the end of the session.  Erika demonstrated good  understanding of the education provided.     See EMR under Patient Instructions for exercises provided 9/30/2022.    Assessment     Manual release of transverse perineals performed this session, as they remain the most bothersome muscles for Erika during intercourse. She continues with most success using imagery to open anus. Single knee to chest also aided in relaxation. Coccyx mobilization introduced, with slightly hypomobility noted with movement into extension with anterior pelvic tilt. Slight asymmetry noted, with coccygeal aligned more on L side of midline. Following mobilization, improved mobility noted during pelvic tilts. Based on performance, Erika would benefit from further review and progression NV.     Erika Is progressing well towards her goals.   Pt prognosis is Good.     Pt will continue to benefit from skilled outpatient physical therapy to address the deficits listed in the problem list box on initial evaluation, provide pt/family education and to maximize pt's level of independence in the home and community environment.     Pt's spiritual, cultural and educational needs considered and pt agreeable to plan of care and goals.     Anticipated barriers to physical therapy: None    Goals:    Short Term Goals: 5 weeks -progressing      Pt to demonstrate proper diaphragmatic breathing to help with calming the nervous system in order to decrease pain and to improve abdominal wall musculature extensibility. - MET 10/28/2022  Pt to demonstrate good body mechanics when performing ADLs such as lifting and bending to decrease strain to lumbopelvic structures and reduce risk of further injury. - MET 12/8/2022  Pt to report a decrease in pain to no more than 5/10 at it's worst with intercourse.  - MET 12/8/2022  Pt will report minimal to no pain with single digit pelvic assessment and display relaxation during this assessment in order to progress to dilator use. - MET 12/8/2022  Pt will report successful tampon use with < or = 5/10  pain for improved activity tolerance.  - MET 12/8/2022  Pt to demonstrate proper positioning on commode with breathing techniques to decrease strain with BM to enable pt to feel empty after BM. - MET 12/8/2022  Pt to be able to bulge pelvic floor which is needed for comfortable BM and complete evacuation. - MET 10/28/2022     Long Term Goals: 10 weeks -progressing      Pt t to report being able to have a comfortable vaginal exam without significant increase in pelvic pain.  Pt to report a decrease in pain to no more than 3/10 at it's worst with vaginal intercourse.    Pt will be independent with use of dilation in order to progress towards self management and intercourse. - MET 10/28/2022  Pt will report successful tampon use with no pain reported to demonstrate a return towards prior level of function. - MET 12/8/2022  Pt to increase hip strength to 5/5 to improve lumbopelvic stability needed to decrease pain.   Pt to report being able to have a spontaneous bowel movement at least once every 2 days to demonstrate improving gut motility and pelvic floor coordination. - MET 12/8/2022    Plan     Plan for next visit: review dilator training    MAAME CEVALLOS, PT    12/19/2022

## 2023-07-03 DIAGNOSIS — Z30.41 ORAL CONTRACEPTIVE PILL SURVEILLANCE: ICD-10-CM

## 2023-07-03 RX ORDER — NORETHINDRONE ACETATE AND ETHINYL ESTRADIOL, AND FERROUS FUMARATE 1.5-30(21)
KIT ORAL
Qty: 84 TABLET | Refills: 0 | Status: SHIPPED | OUTPATIENT
Start: 2023-07-03 | End: 2023-08-23 | Stop reason: SDUPTHER

## 2023-08-23 ENCOUNTER — OFFICE VISIT (OUTPATIENT)
Dept: OBSTETRICS AND GYNECOLOGY | Facility: CLINIC | Age: 28
End: 2023-08-23
Payer: COMMERCIAL

## 2023-08-23 VITALS
HEIGHT: 64 IN | WEIGHT: 134.25 LBS | BODY MASS INDEX: 22.92 KG/M2 | DIASTOLIC BLOOD PRESSURE: 74 MMHG | SYSTOLIC BLOOD PRESSURE: 122 MMHG

## 2023-08-23 DIAGNOSIS — Z11.3 SCREEN FOR STD (SEXUALLY TRANSMITTED DISEASE): ICD-10-CM

## 2023-08-23 DIAGNOSIS — Z30.41 ORAL CONTRACEPTIVE PILL SURVEILLANCE: ICD-10-CM

## 2023-08-23 DIAGNOSIS — Z01.419 ENCOUNTER FOR ANNUAL ROUTINE GYNECOLOGICAL EXAMINATION: Primary | ICD-10-CM

## 2023-08-23 PROCEDURE — 1159F PR MEDICATION LIST DOCUMENTED IN MEDICAL RECORD: ICD-10-PCS | Mod: CPTII,S$GLB,, | Performed by: OBSTETRICS & GYNECOLOGY

## 2023-08-23 PROCEDURE — 99999 PR PBB SHADOW E&M-EST. PATIENT-LVL III: CPT | Mod: PBBFAC,,, | Performed by: OBSTETRICS & GYNECOLOGY

## 2023-08-23 PROCEDURE — 3074F PR MOST RECENT SYSTOLIC BLOOD PRESSURE < 130 MM HG: ICD-10-PCS | Mod: CPTII,S$GLB,, | Performed by: OBSTETRICS & GYNECOLOGY

## 2023-08-23 PROCEDURE — 1160F PR REVIEW ALL MEDS BY PRESCRIBER/CLIN PHARMACIST DOCUMENTED: ICD-10-PCS | Mod: CPTII,S$GLB,, | Performed by: OBSTETRICS & GYNECOLOGY

## 2023-08-23 PROCEDURE — 3074F SYST BP LT 130 MM HG: CPT | Mod: CPTII,S$GLB,, | Performed by: OBSTETRICS & GYNECOLOGY

## 2023-08-23 PROCEDURE — 3008F BODY MASS INDEX DOCD: CPT | Mod: CPTII,S$GLB,, | Performed by: OBSTETRICS & GYNECOLOGY

## 2023-08-23 PROCEDURE — 1160F RVW MEDS BY RX/DR IN RCRD: CPT | Mod: CPTII,S$GLB,, | Performed by: OBSTETRICS & GYNECOLOGY

## 2023-08-23 PROCEDURE — 87591 N.GONORRHOEAE DNA AMP PROB: CPT | Performed by: OBSTETRICS & GYNECOLOGY

## 2023-08-23 PROCEDURE — 3078F PR MOST RECENT DIASTOLIC BLOOD PRESSURE < 80 MM HG: ICD-10-PCS | Mod: CPTII,S$GLB,, | Performed by: OBSTETRICS & GYNECOLOGY

## 2023-08-23 PROCEDURE — 99395 PR PREVENTIVE VISIT,EST,18-39: ICD-10-PCS | Mod: S$GLB,,, | Performed by: OBSTETRICS & GYNECOLOGY

## 2023-08-23 PROCEDURE — 99999 PR PBB SHADOW E&M-EST. PATIENT-LVL III: ICD-10-PCS | Mod: PBBFAC,,, | Performed by: OBSTETRICS & GYNECOLOGY

## 2023-08-23 PROCEDURE — 3078F DIAST BP <80 MM HG: CPT | Mod: CPTII,S$GLB,, | Performed by: OBSTETRICS & GYNECOLOGY

## 2023-08-23 PROCEDURE — 99395 PREV VISIT EST AGE 18-39: CPT | Mod: S$GLB,,, | Performed by: OBSTETRICS & GYNECOLOGY

## 2023-08-23 PROCEDURE — 1159F MED LIST DOCD IN RCRD: CPT | Mod: CPTII,S$GLB,, | Performed by: OBSTETRICS & GYNECOLOGY

## 2023-08-23 PROCEDURE — 3008F PR BODY MASS INDEX (BMI) DOCUMENTED: ICD-10-PCS | Mod: CPTII,S$GLB,, | Performed by: OBSTETRICS & GYNECOLOGY

## 2023-08-23 RX ORDER — NORETHINDRONE ACETATE AND ETHINYL ESTRADIOL 1.5-30(21)
1 KIT ORAL DAILY
Qty: 84 TABLET | Refills: 3 | Status: SHIPPED | OUTPATIENT
Start: 2023-08-23

## 2023-08-23 RX ORDER — SPIRONOLACTONE 100 MG/1
100 TABLET, FILM COATED ORAL
COMMUNITY
Start: 2023-08-04

## 2023-08-23 NOTE — PROGRESS NOTES
"Chief Complaint: Well Woman Exam     HPI:      Erika Beavers is a 27 y.o.  who presents today for well woman exam.  LMP: No LMP recorded (lmp unknown).  No issues, problems, or complaints. Specifically, patient denies abnormal vaginal bleeding, discharge, pelvic pain, urinary problems, or changes in appetite. Ms. Beavers is currently sexually active with a single male partner. She is currently using oral contraceptives (estrogen/progesterone) for contraception. She would like STD screening today.    Previous Pap: NILM (2022)    Gardasil:Completed     Ms. Beavers confirms that she wears her seatbelt when riding in the car and does not text while driving.     OB History          0    Para   0    Term   0       0    AB   0    Living   0         SAB   0    IAB   0    Ectopic   0    Multiple   0    Live Births               Obstetric Comments   Menarche- 15             ROS:     GENERAL: Denies unintentional weight gain or weight loss. Feeling well overall.   SKIN: Denies rash or lesions.   HEENT: Denies headaches, or vision changes.   CARDIOVASCULAR: Denies palpitations or chest pain.   RESPIRATORY: Denies shortness of breath or dyspnea on exertion.  BREASTS: Denies lumps or nipple discharge.   ABDOMEN: +IBS alternating between constipation and diarrhea.   URINARY: Denies frequency, dysuria.  NEUROLOGIC: Denies syncope or weakness.   PSYCHIATRIC: Denies uncontrolled depression or anxiety.    Physical Exam:      PHYSICAL EXAM:  /74   Ht 5' 4" (1.626 m)   Wt 60.9 kg (134 lb 4.2 oz)   LMP  (LMP Unknown)   BMI 23.05 kg/m²   Body mass index is 23.05 kg/m².     APPEARANCE: Well nourished, well developed, in no acute distress.  PSYCH: Appropriate mood and affect.  SKIN: No acne or hirsutism  NECK: Neck symmetric without masses  NODES: No inguinal, axillary, or supraclavicular lymph node enlargement  ABDOMEN: Soft.  No tenderness or masses.    CARDIOVASCULAR: No edema of peripheral " extremities  BREASTS: Symmetrical, no visible skin lesions.  Left breast without masses. Right breast still feels much more dense (imaging last year WNL) . No nipple discharge bilaterally.  PELVIC: Normal external genitalia without lesions.  Normal hair distribution.  Adequate perineal body, normal urethral meatus.  Vagina moist and well rugated. Without lesions. Vagina without abnormal discharge.  Cervix pink, without lesions, discharge or tenderness.  No significant cystocele or rectocele.  Bimanual exam shows uterus to be normal size, regular, mobile and nontender.  Adnexa without masses or tenderness.      Assessment/Plan:     Encounter for annual routine gynecological examination    Oral contraceptive pill surveillance  -     norethindrone-ethinyl estradiol-iron (JUNEL FE 1.5/30, 28,) 1.5 mg-30 mcg (21)/75 mg (7) tablet; Take 1 tablet by mouth once daily.  Dispense: 84 tablet; Refill: 3    Screen for STD (sexually transmitted disease)  -     C. trachomatis/N. gonorrhoeae by AMP DNA [facility]; Cervicovaginal      Follow up in about 1 year (around 8/23/2024) for Annual Exam.    Counseling:     Patient was counseled today on current ASCCP pap guidelines, the recommendation for yearly physical exams, safe driving habits, breast self awareness. She is to see her PCP for other health maintenance.     Use of the [employer] Patient Portal discussed and encouraged during today's visit.

## 2023-08-24 LAB
C TRACH DNA SPEC QL NAA+PROBE: NOT DETECTED
N GONORRHOEA DNA SPEC QL NAA+PROBE: NOT DETECTED

## 2023-12-06 ENCOUNTER — TELEPHONE (OUTPATIENT)
Dept: OBSTETRICS AND GYNECOLOGY | Facility: CLINIC | Age: 28
End: 2023-12-06
Payer: COMMERCIAL

## 2024-01-22 ENCOUNTER — OFFICE VISIT (OUTPATIENT)
Dept: OBSTETRICS AND GYNECOLOGY | Facility: CLINIC | Age: 29
End: 2024-01-22
Payer: COMMERCIAL

## 2024-01-22 ENCOUNTER — TELEPHONE (OUTPATIENT)
Dept: OBSTETRICS AND GYNECOLOGY | Facility: CLINIC | Age: 29
End: 2024-01-22
Payer: COMMERCIAL

## 2024-01-22 VITALS
SYSTOLIC BLOOD PRESSURE: 110 MMHG | DIASTOLIC BLOOD PRESSURE: 74 MMHG | HEIGHT: 64 IN | BODY MASS INDEX: 23.26 KG/M2 | WEIGHT: 136.25 LBS

## 2024-01-22 DIAGNOSIS — R10.2 PELVIC PAIN: ICD-10-CM

## 2024-01-22 DIAGNOSIS — R10.2 PELVIC PAIN IN FEMALE: Primary | ICD-10-CM

## 2024-01-22 DIAGNOSIS — N94.10 DYSPAREUNIA, FEMALE: Primary | ICD-10-CM

## 2024-01-22 DIAGNOSIS — N94.10 DYSPAREUNIA, FEMALE: ICD-10-CM

## 2024-01-22 PROCEDURE — 3078F DIAST BP <80 MM HG: CPT | Mod: CPTII,S$GLB,, | Performed by: OBSTETRICS & GYNECOLOGY

## 2024-01-22 PROCEDURE — 99999 PR PBB SHADOW E&M-EST. PATIENT-LVL III: CPT | Mod: PBBFAC,,, | Performed by: OBSTETRICS & GYNECOLOGY

## 2024-01-22 PROCEDURE — 1160F RVW MEDS BY RX/DR IN RCRD: CPT | Mod: CPTII,S$GLB,, | Performed by: OBSTETRICS & GYNECOLOGY

## 2024-01-22 PROCEDURE — 99215 OFFICE O/P EST HI 40 MIN: CPT | Mod: 57,S$GLB,, | Performed by: OBSTETRICS & GYNECOLOGY

## 2024-01-22 PROCEDURE — 3074F SYST BP LT 130 MM HG: CPT | Mod: CPTII,S$GLB,, | Performed by: OBSTETRICS & GYNECOLOGY

## 2024-01-22 PROCEDURE — 3008F BODY MASS INDEX DOCD: CPT | Mod: CPTII,S$GLB,, | Performed by: OBSTETRICS & GYNECOLOGY

## 2024-01-22 PROCEDURE — 1159F MED LIST DOCD IN RCRD: CPT | Mod: CPTII,S$GLB,, | Performed by: OBSTETRICS & GYNECOLOGY

## 2024-01-22 NOTE — PROGRESS NOTES
Subjective:      Patient ID: Erika Beavers is a 28 y.o. female.    Chief Complaint:  Endometriosis      History of Present Illness  Pelvic Pain  The patient's primary symptoms include pelvic pain. The patient's pertinent negatives include no vaginal discharge. This is a recurrent problem. The current episode started more than 1 year ago. The problem occurs intermittently. The problem has been gradually worsening. The pain is moderate. She is not pregnant. Associated symptoms include abdominal pain, anorexia, back pain, constipation, diarrhea, flank pain, nausea and painful intercourse. Pertinent negatives include no dysuria, frequency, headaches or rash. The symptoms are aggravated by bowel movements, tactile pressure and menstrual cycle. She has tried acetaminophen, heating pad, NSAIDs and warm bath for the symptoms. The treatment provided mild relief. She is sexually active. No, her partner does not have an STD. She uses oral contraceptives for contraception. Her menstrual history has been irregular. Her past medical history is significant for menorrhagia, metrorrhagia, ovarian cysts and vaginosis. There is no history of an abdominal surgery, a  section, an ectopic pregnancy, endometriosis, a gynecological surgery, herpes simplex, miscarriage, perineal abscess, PID, an STD or a terminated pregnancy.     Pt is 28 y.o. here seeking a second opinion regarding pelvic pain.  Pt had a traumatic straddle injury as a child (fell on a diving board, had 35 vaginal stitches).  Used pads only for menses.  Since then, had suffered with painful cycles that she more attention to after she had a ruptured cyst at age 20.  She was hospitalized 3 times for this ovarian cyst (no surgery).      Became sexually active at age 21.  And has always had pain with intercourse.  Also had a number of GI sx suspicious for IBS (alternating constipation and diarrhea).  GI tried linzess and that was terrible.  She did have pelvic  floor PT and that helped with her ability to have penetrative vaginal intercourse.  Now uses continuous ocp's but still has significant pain.    Has been talking to friends and now appreciates that her experience is different than what other women experience with cycles and intercourse.  Wants to find out what the issue is.    GYN & OB History  Patient's last menstrual period was 2024 (exact date).   Date of Last Pap: 2022    OB History    Para Term  AB Living   0 0 0 0 0 0   SAB IAB Ectopic Multiple Live Births   0 0 0 0     Obstetric Comments   Menarche- 15       Review of Systems  Review of Systems   Constitutional:  Negative for activity change, appetite change and fatigue.   HENT: Negative.  Negative for tinnitus.    Eyes: Negative.    Respiratory:  Negative for cough and shortness of breath.    Cardiovascular:  Negative for chest pain and palpitations.   Gastrointestinal:  Positive for abdominal pain, anorexia, constipation, diarrhea and nausea. Negative for blood in stool.   Endocrine: Negative.  Negative for hot flashes.   Genitourinary:  Positive for flank pain, menorrhagia and pelvic pain. Negative for dysmenorrhea, dyspareunia, dysuria, frequency, menstrual problem, vaginal discharge, urinary incontinence and postcoital bleeding.   Musculoskeletal:  Positive for back pain. Negative for joint swelling.   Integumentary:  Negative for rash, breast mass, nipple discharge and breast skin changes.   Neurological: Negative.  Negative for headaches.   Hematological: Negative.  Does not bruise/bleed easily.   Psychiatric/Behavioral:  The patient is not nervous/anxious.    Breast: negative.  Negative for mass, nipple discharge and skin changes         Objective:     Physical Exam:   Constitutional: She is oriented to person, place, and time. She appears well-developed and well-nourished. No distress.    HENT:   Head: Normocephalic and atraumatic.    Eyes: Pupils are equal, round, and  reactive to light. Conjunctivae and lids are normal.     Cardiovascular:  Normal rate and regular rhythm.      Exam reveals no edema.        Pulmonary/Chest: Effort normal.        Abdominal: Soft. Bowel sounds are normal. She exhibits no distension. There is no abdominal tenderness. There is no rebound and no guarding.     Genitourinary:    Vagina, uterus, right adnexa and left adnexa normal.      Pelvic exam was performed with patient supine.   The external female genitalia was normal.     Labial bartholins normal.There is no tenderness or lesion on the right labia. There is no tenderness or lesion on the left labia. Cervix is normal. Right adnexum displays no mass and no tenderness. Left adnexum displays no mass and no tenderness. No vaginal discharge, rectocele, cystocele or prolapse of vaginal walls in the vagina. Cervix exhibits no motion tenderness and no discharge. Uterus is not deviated, not fixed and not hosting fibroids. Normal urethral meatus.Urethra findings: no tendernessBladder findings: no bladder tenderness   Genitourinary Comments: Retroflexed uterus.  Limited mobility of the uterus.  Suspect cul-de-sac disease             Musculoskeletal: Normal range of motion and moves all extremeties.       Neurological: She is alert and oriented to person, place, and time. She has normal strength.    Skin: Skin is warm and dry.    Psychiatric: She has a normal mood and affect. Her speech is normal and behavior is normal. Thought content normal. Her mood appears not anxious. She does not exhibit a depressed mood. She expresses no suicidal plans and no homicidal plans.         Assessment:     1. Pelvic pain in female    2. Dyspareunia, female               Plan:     Erika was seen today for endometriosis.    Diagnoses and all orders for this visit:    Pelvic pain in female  We had a long discussion about her complex history and different triggers for her pain.  While her dyspareunia may be related to spasm from  her prior straddle injury, I am suspicious that she may have endometriosis.  We discussed how we could treat this based on clinical sx or consider surgical exploration to confirm the diagnosis and excise any endo that we see.  Pt wants a more definitive diagnosis and wants to proceed with LSC.  R/b/I/a of the procedure discussed.  All questions answered.  Dyspareunia, female      I spent a total of 60 minutes on the day of the visit.This includes face to face time and non-face to face time preparing to see the patient (eg, review of tests), obtaining and/or reviewing separately obtained history, documenting clinical information in the electronic or other health record, independently interpreting results and communicating results to the patient/family/caregiver, or care coordinator.

## 2024-01-22 NOTE — TELEPHONE ENCOUNTER
----- Message from Angie Lord MA sent at 1/22/2024  3:58 PM CST -----  Do case request for 3/4 Dx Lap/ preop&pre-admit appt made

## 2024-01-25 ENCOUNTER — TELEPHONE (OUTPATIENT)
Dept: OBSTETRICS AND GYNECOLOGY | Facility: CLINIC | Age: 29
End: 2024-01-25
Payer: COMMERCIAL

## 2024-01-25 NOTE — TELEPHONE ENCOUNTER
----- Message from Nani Slaughter sent at 1/25/2024  3:05 PM CST -----  Pt is scheduled with Dr. Castro, she wants to reschedule it. Call back number is [phone].

## 2024-01-25 NOTE — TELEPHONE ENCOUNTER
----- Message from Coby Slaughter sent at 1/25/2024  4:15 PM CST -----      Name of Who is Calling: DELGADO SANDRA [[id number]]      What is the request in detail: Pt returned call tot he office.Please contact to further discuss and advise.          Can the clinic reply by [facility]: Y      What Number [facility]  [phone]

## 2024-04-09 ENCOUNTER — ANESTHESIA EVENT (OUTPATIENT)
Dept: SURGERY | Facility: OTHER | Age: 29
End: 2024-04-09
Payer: COMMERCIAL

## 2024-04-11 ENCOUNTER — OFFICE VISIT (OUTPATIENT)
Dept: OBSTETRICS AND GYNECOLOGY | Facility: CLINIC | Age: 29
End: 2024-04-11
Payer: COMMERCIAL

## 2024-04-11 ENCOUNTER — HOSPITAL ENCOUNTER (OUTPATIENT)
Dept: PREADMISSION TESTING | Facility: OTHER | Age: 29
Discharge: HOME | End: 2024-04-11
Attending: OBSTETRICS & GYNECOLOGY
Payer: COMMERCIAL

## 2024-04-11 VITALS
SYSTOLIC BLOOD PRESSURE: 126 MMHG | WEIGHT: 131.38 LBS | BODY MASS INDEX: 22.43 KG/M2 | DIASTOLIC BLOOD PRESSURE: 82 MMHG | HEIGHT: 64 IN

## 2024-04-11 VITALS
SYSTOLIC BLOOD PRESSURE: 127 MMHG | RESPIRATION RATE: 16 BRPM | OXYGEN SATURATION: 99 % | DIASTOLIC BLOOD PRESSURE: 72 MMHG | BODY MASS INDEX: 22.36 KG/M2 | HEIGHT: 64 IN | TEMPERATURE: 98 F | WEIGHT: 131 LBS | HEART RATE: 76 BPM

## 2024-04-11 DIAGNOSIS — R10.2 PELVIC PAIN: Primary | ICD-10-CM

## 2024-04-11 PROBLEM — C50.919 BREAST CANCER: Status: RESOLVED | Noted: 2022-08-29 | Resolved: 2024-04-11

## 2024-04-11 PROCEDURE — 99999 PR PBB SHADOW E&M-EST. PATIENT-LVL III: CPT | Mod: PBBFAC,,, | Performed by: OBSTETRICS & GYNECOLOGY

## 2024-04-11 PROCEDURE — 99499 UNLISTED E&M SERVICE: CPT | Mod: S$GLB,,, | Performed by: OBSTETRICS & GYNECOLOGY

## 2024-04-11 RX ORDER — MUPIROCIN 20 MG/G
OINTMENT TOPICAL
Status: CANCELLED | OUTPATIENT
Start: 2024-04-11

## 2024-04-11 RX ORDER — FAMOTIDINE 20 MG/1
20 TABLET, FILM COATED ORAL
Status: CANCELLED | OUTPATIENT
Start: 2024-04-11

## 2024-04-11 RX ORDER — SODIUM CHLORIDE 9 MG/ML
INJECTION, SOLUTION INTRAVENOUS CONTINUOUS
Status: CANCELLED | OUTPATIENT
Start: 2024-04-11

## 2024-04-11 NOTE — DISCHARGE INSTRUCTIONS
Information to Prepare you for your Surgery    PRE-ADMIT TESTING -  [phone]    [address]  [facility]          Your surgery has been scheduled at [facility]. We are pleased to have the opportunity to serve you. For Further Information please call [phone].    On the day of surgery please report to the Information Desk on the 1st floor.    CONTACT YOUR PHYSICIAN'S OFFICE THE DAY PRIOR TO YOUR SURGERY TO OBTAIN YOUR ARRIVAL TIME.     The evening before surgery do not eat anything after 9 p.m. ( this includes hard candy, chewing gum and mints).  You may only have GATORADE, POWERADE AND WATER  from 9 p.m. until you leave your home.   DO NOT DRINK ANY LIQUIDS ON THE WAY TO THE HOSPITAL.      Why does your anesthesiologist allow you to drink Gatorade/Powerade before surgery?  Gatorade/Powerade helps to increase your comfort before surgery and to decrease your nausea after surgery. The carbohydrates in Gatorade/Powerade help reduce your body's stress response to surgery.  If you are a diabetic-drink only water prior to surgery.    Outpatient Surgery- May allow 2 adult (18 and older) Support Persons (1 being the designated ) for all surgical/procedural patients. A breastfeeding mother will be allowed her infant and 2 adult Support Persons. No one under the age of 18 will be allowed in the building. No swapping out of visitors in the [facility].      SPECIAL MEDICATION INSTRUCTIONS: TAKE medications checked off by the Anesthesiologist on your Medication List.    Angiogram Patients: Take medications as instructed by your physician, including aspirin.     Surgery Patients:    If you take ASPIRIN - Your PHYSICIAN/SURGEON will need to inform you IF/OR when you need to stop taking aspirin prior to your surgery.     The week prior to surgery do not ot take any medications containing IBUPROFEN or NSAIDS ( Advil, Motrin, Goodys, BC, Aleve, Naproxen etc)  If you are not sure if you should take a medicine please call your surgeon's office.  Ok to take Tylenol    Do Not Wear any make-up (especially eye make-up) to surgery. Please remove any false eyelashes or eyelash extensions. If you arrive the day of surgery with makeup/eyelashes on you will be required to remove prior to surgery. (There is a risk of corneal abrasions if eye makeup/eyelash extensions are not removed)      Leave all valuables at home.   Do Not wear any jewelry or watches, including any metal in body piercings. Jewelry must be removed prior to coming to the hospital.  There is a possibility that rings that are unable to be removed may be cut off if they are on the surgical extremity.    Please remove all hair extensions, wigs, clips and any other metal accessories/ ornaments from your hair.  These items may pose a flammable/fire risk in Surgery and must be removed.    Do not shave your surgical area at least 5 days prior to your surgery. The surgical prep will be performed at the hospital according to Infection Control regulations.    Contact Lens must be removed before surgery. Either do not wear the contact lens or bring a case and solution for storage.  Please bring a container for eyeglasses or dentures as required.  Bring any paperwork your physician has provided, such as consent forms,  history and physicals, doctor's orders, etc.   Bring comfortable clothes that are loose fitting to wear upon discharge. Take into consideration the type of surgery being performed.  Maintain your diet as advised per your physician the day prior to surgery.      Adequate rest the night before surgery is advised.   Park in the Parking lot behind the hospital or in the [facility] across the street from the parking lot. Parking is complimentary.  If you will be discharged the same day as your procedure, please arrange for a responsible adult to drive you home or to accompany you if traveling by taxi.    YOU WILL NOT BE PERMITTED TO DRIVE OR TO LEAVE THE HOSPITAL ALONE AFTER SURGERY.   If you are being discharged the same day, it is strongly recommended that you arrange for someone to remain with you for the first 24 hrs following your surgery.    The Surgeon will speak to your family/visitor after your surgery regarding the outcome of your surgery and post op care.  The Surgeon may speak to you after your surgery, but there is a possibility you may not remember the details.  Please check with your family members regarding the conversation with the Surgeon.    We strongly recommend whoever is bringing you home be present for discharge instructions.  This will ensure a thorough understanding for your post op home care.          Thank you for your cooperation.  The Staff of [facility].            Bathing Instructions with Hibiclens    Shower the evening before and morning of your procedure with Chlorhexidine (Hibiclens)  do not use Chlorhexidine on your face or genitals. Do not get in your eyes.  Wash your face with water and your regular face wash/soap  Use your regular shampoo  Apply Chlorhexidine (Hibiclens) directly on your skin or on a wet washcloth and wash gently. When showering: Move away from the shower stream when applying Chlorhexidine (Hibiclens) to avoid rinsing off too soon.  Rinse thoroughly with warm water  Do not dilute Chlorhexidine (Hibiclens)   Dry off as usual, do not use any deodorant, powder, body lotions, perfume, after shave or cologne.

## 2024-04-11 NOTE — H&P (VIEW-ONLY)
Subjective     Patient ID: Erika Beavers is a 28 y.o. female.    Chief Complaint:  Pre-op Exam      History of Present Illness  HPI  Pt is 28 y.o. with Patient's last menstrual period was 2024 (exact date). Here in preop for Dx LSC, possible excision of endometriosis.  Currently on ocp's.  Pain is more midline    GYN & OB History  Patient's last menstrual period was 2024 (exact date).   Date of Last Pap: 2022    OB History    Para Term  AB Living   0 0 0 0 0 0   SAB IAB Ectopic Multiple Live Births   0 0 0 0     Obstetric Comments   Menarche- 15       Review of Systems  Review of Systems   Constitutional:  Negative for activity change, appetite change and fatigue.   HENT: Negative.  Negative for tinnitus.    Eyes: Negative.    Respiratory:  Negative for cough and shortness of breath.    Cardiovascular:  Negative for chest pain and palpitations.   Gastrointestinal: Negative.  Negative for abdominal pain, blood in stool, constipation, diarrhea and nausea.   Endocrine: Negative.  Negative for hot flashes.   Genitourinary:  Negative for dysmenorrhea, dyspareunia, dysuria, frequency, menstrual problem, pelvic pain, vaginal discharge, urinary incontinence and postcoital bleeding.   Musculoskeletal:  Negative for back pain and joint swelling.   Integumentary:  Negative for rash, breast mass, nipple discharge and breast skin changes.   Neurological: Negative.  Negative for headaches.   Hematological: Negative.  Does not bruise/bleed easily.   Psychiatric/Behavioral:  The patient is not nervous/anxious.    Breast: negative.  Negative for mass, nipple discharge and skin changes         Objective   Physical Exam:   Constitutional: She is oriented to person, place, and time. She appears well-developed and well-nourished. No distress.    HENT:   Head: Normocephalic and atraumatic.    Eyes: Pupils are equal, round, and reactive to light. Conjunctivae and lids are normal.     Cardiovascular:   Normal rate.      Exam reveals no edema.        Pulmonary/Chest: Effort normal.        Abdominal: Soft. Bowel sounds are normal. She exhibits no distension. There is no abdominal tenderness. There is no rebound and no guarding.             Musculoskeletal: Normal range of motion and moves all extremeties.       Neurological: She is alert and oriented to person, place, and time. She has normal strength.    Skin: Skin is warm and dry.    Psychiatric: She has a normal mood and affect. Her speech is normal and behavior is normal. Thought content normal. Her mood appears not anxious. She does not exhibit a depressed mood. She expresses no suicidal plans and no homicidal plans.            Assessment and Plan     1. Pelvic pain             Plan:  Erika was seen today for pre-op exam.    Diagnoses and all orders for this visit:    Pelvic pain  -     Full code; Standing  -     Vital signs; Standing  -     Insert peripheral IV; Standing  -     Chlorhexidine (CHG) 2% Wipes; Standing  -     Notify Physician/Vital Signs Parameters Urine output less than 0.5mL/kg/hr (with indwelling catheter) or 30 mL/hr (without indwelling catheter) or blood glucose greater than 200 mg/dL; Standing  -     Notify physician; Standing  -     Notify Physician - Potential Need of Opioid Reversal; Standing  -     Diet NPO; Standing  -     Place sequential compression device; Standing  -     Chlorohexidine Gluconate Bath; Standing  -     Place in Outpatient; Standing  -     Bed rest with bathroom privileges; Standing  -     Pregnancy, urine rapid; Standing    Other orders  -     0.9%  NaCl infusion  -     IP VTE HIGH RISK PATIENT; Standing  -     mupirocin 2 % ointment  -     famotidine tablet 20 mg      Factual information regarding the medical condition and the need for Dx LSC was discussed with the patient, who verbalized understanding. The therapeutic rationale, benefits, risks and potential complications were discussed, including the possibility  of pain, bleeding, infection, loss of function, disfigurement, death or other unforeseen complications. Alternatives to the procedure were discussed (including potential risks, complications and benefits of each). No guarantee was expressed or implied as to the results of the procedure. Informed consent was given, as well as a request to proceed.

## 2024-04-11 NOTE — ANESTHESIA PREPROCEDURE EVALUATION
04/11/2024  Erika Beavers is a 28 y.o., female.      Pre-op Assessment    I have reviewed the Patient Summary Reports.     I have reviewed the Nursing Notes. I have reviewed the NPO Status.   I have reviewed the Medications.     Review of Systems  Anesthesia Hx:  No previous Anesthesia             Denies Family Hx of Anesthesia complications.    Denies Personal Hx of Anesthesia complications.                    Hematology/Oncology:  Hematology Normal   Oncology Normal                                   EENT/Dental:  EENT/Dental Normal           Cardiovascular:  Cardiovascular Normal                                            Pulmonary:  Pulmonary Normal                       Renal/:  Renal/ Normal                 Hepatic/GI:  Hepatic/GI Normal                 Musculoskeletal:  Musculoskeletal Normal                Neurological:  Neurology Normal                                      Endocrine:  Endocrine Normal            Dermatological:  Skin Normal    Psych:  Psychiatric Normal                    Physical Exam  General: Well nourished and Alert    Airway:  Mallampati: II   Mouth Opening: Normal  Tongue: Normal    Dental:  Intact        Anesthesia Plan  Type of Anesthesia, risks & benefits discussed:    Anesthesia Type: Gen ETT  Intra-op Monitoring Plan: Standard ASA Monitors  Post Op Pain Control Plan: multimodal analgesia  Induction:  IV  Airway Plan: Video  Informed Consent: Informed consent signed with the Patient and all parties understand the risks and agree with anesthesia plan.  All questions answered.   ASA Score: 1  Anesthesia Plan Notes: Healthy no labs    Ready For Surgery From Anesthesia Perspective.     .

## 2024-04-15 ENCOUNTER — ANESTHESIA (OUTPATIENT)
Dept: SURGERY | Facility: OTHER | Age: 29
End: 2024-04-15
Payer: COMMERCIAL

## 2024-04-15 ENCOUNTER — HOSPITAL ENCOUNTER (OUTPATIENT)
Facility: OTHER | Age: 29
Discharge: HOME | End: 2024-04-15
Attending: OBSTETRICS & GYNECOLOGY | Admitting: OBSTETRICS & GYNECOLOGY
Payer: COMMERCIAL

## 2024-04-15 DIAGNOSIS — Z98.890 S/P LAPAROSCOPY: Primary | ICD-10-CM

## 2024-04-15 DIAGNOSIS — R10.2 PELVIC PAIN: ICD-10-CM

## 2024-04-15 LAB
B-HCG UR QL: NEGATIVE
CTP QC/QA: YES

## 2024-04-15 PROCEDURE — 58662 LAPAROSCOPY EXCISE LESIONS: CPT | Mod: 22,,, | Performed by: OBSTETRICS & GYNECOLOGY

## 2024-04-15 PROCEDURE — 88305 TISSUE EXAM BY PATHOLOGIST: CPT | Mod: 59 | Performed by: PATHOLOGY

## 2024-04-15 PROCEDURE — 71000033 HC RECOVERY, INTIAL HOUR: Performed by: OBSTETRICS & GYNECOLOGY

## 2024-04-15 PROCEDURE — 37000008 HC ANESTHESIA 1ST 15 MINUTES: Performed by: OBSTETRICS & GYNECOLOGY

## 2024-04-15 PROCEDURE — 71000015 HC POSTOP RECOV 1ST HR: Performed by: OBSTETRICS & GYNECOLOGY

## 2024-04-15 PROCEDURE — 25000003 PHARM REV CODE 250: Performed by: OBSTETRICS & GYNECOLOGY

## 2024-04-15 PROCEDURE — 27201423 OPTIME MED/SURG SUP & DEVICES STERILE SUPPLY: Performed by: OBSTETRICS & GYNECOLOGY

## 2024-04-15 PROCEDURE — 88305 TISSUE EXAM BY PATHOLOGIST: CPT | Mod: 26,,, | Performed by: PATHOLOGY

## 2024-04-15 PROCEDURE — 36000708 HC OR TIME LEV III 1ST 15 MIN: Performed by: OBSTETRICS & GYNECOLOGY

## 2024-04-15 PROCEDURE — 71000039 HC RECOVERY, EACH ADD'L HOUR: Performed by: OBSTETRICS & GYNECOLOGY

## 2024-04-15 PROCEDURE — 36000709 HC OR TIME LEV III EA ADD 15 MIN: Performed by: OBSTETRICS & GYNECOLOGY

## 2024-04-15 PROCEDURE — 81025 URINE PREGNANCY TEST: CPT | Performed by: ANESTHESIOLOGY

## 2024-04-15 PROCEDURE — 25000003 PHARM REV CODE 250: Performed by: ANESTHESIOLOGY

## 2024-04-15 PROCEDURE — D9220A PRA ANESTHESIA: Mod: ANES,,, | Performed by: ANESTHESIOLOGY

## 2024-04-15 PROCEDURE — 71000016 HC POSTOP RECOV ADDL HR: Performed by: OBSTETRICS & GYNECOLOGY

## 2024-04-15 PROCEDURE — 25000003 PHARM REV CODE 250: Performed by: NURSE ANESTHETIST, CERTIFIED REGISTERED

## 2024-04-15 PROCEDURE — 37000009 HC ANESTHESIA EA ADD 15 MINS: Performed by: OBSTETRICS & GYNECOLOGY

## 2024-04-15 PROCEDURE — 50949 UNLISTED LAPS PX URETER: CPT | Mod: 50,59,, | Performed by: OBSTETRICS & GYNECOLOGY

## 2024-04-15 PROCEDURE — 63600175 PHARM REV CODE 636 W HCPCS: Performed by: ANESTHESIOLOGY

## 2024-04-15 PROCEDURE — 63600175 PHARM REV CODE 636 W HCPCS: Performed by: NURSE ANESTHETIST, CERTIFIED REGISTERED

## 2024-04-15 PROCEDURE — D9220A PRA ANESTHESIA: Mod: CRNA,,, | Performed by: NURSE ANESTHETIST, CERTIFIED REGISTERED

## 2024-04-15 RX ORDER — OXYCODONE HYDROCHLORIDE 5 MG/1
5 TABLET ORAL EVERY 4 HOURS PRN
Qty: 6 TABLET | Refills: 0 | Status: SHIPPED | OUTPATIENT
Start: 2024-04-15 | End: 2024-06-06

## 2024-04-15 RX ORDER — ACETAMINOPHEN 500 MG
1000 TABLET ORAL
Status: COMPLETED | OUTPATIENT
Start: 2024-04-15 | End: 2024-04-15

## 2024-04-15 RX ORDER — SCOLOPAMINE TRANSDERMAL SYSTEM 1 MG/1
PATCH, EXTENDED RELEASE TRANSDERMAL
Status: DISCONTINUED | OUTPATIENT
Start: 2024-04-15 | End: 2024-04-15

## 2024-04-15 RX ORDER — PROPOFOL 10 MG/ML
VIAL (ML) INTRAVENOUS
Status: DISCONTINUED | OUTPATIENT
Start: 2024-04-15 | End: 2024-04-15

## 2024-04-15 RX ORDER — SODIUM CHLORIDE, SODIUM LACTATE, POTASSIUM CHLORIDE, CALCIUM CHLORIDE 600; 310; 30; 20 MG/100ML; MG/100ML; MG/100ML; MG/100ML
INJECTION, SOLUTION INTRAVENOUS CONTINUOUS
Status: DISCONTINUED | OUTPATIENT
Start: 2024-04-15 | End: 2024-04-15 | Stop reason: HOSPADM

## 2024-04-15 RX ORDER — PROCHLORPERAZINE EDISYLATE 5 MG/ML
5 INJECTION INTRAMUSCULAR; INTRAVENOUS EVERY 30 MIN PRN
Status: COMPLETED | OUTPATIENT
Start: 2024-04-15 | End: 2024-04-15

## 2024-04-15 RX ORDER — FAMOTIDINE 20 MG/1
20 TABLET, FILM COATED ORAL
Status: COMPLETED | OUTPATIENT
Start: 2024-04-15 | End: 2024-04-15

## 2024-04-15 RX ORDER — IBUPROFEN 600 MG/1
600 TABLET ORAL EVERY 6 HOURS
Qty: 56 TABLET | Refills: 0 | Status: SHIPPED | OUTPATIENT
Start: 2024-04-15 | End: 2024-04-29

## 2024-04-15 RX ORDER — KETOROLAC TROMETHAMINE 30 MG/ML
INJECTION, SOLUTION INTRAMUSCULAR; INTRAVENOUS
Status: DISCONTINUED | OUTPATIENT
Start: 2024-04-15 | End: 2024-04-15

## 2024-04-15 RX ORDER — HYDROMORPHONE HYDROCHLORIDE 2 MG/ML
0.2 INJECTION, SOLUTION INTRAMUSCULAR; INTRAVENOUS; SUBCUTANEOUS
Status: DISCONTINUED | OUTPATIENT
Start: 2024-04-15 | End: 2024-04-15 | Stop reason: HOSPADM

## 2024-04-15 RX ORDER — ONDANSETRON HYDROCHLORIDE 2 MG/ML
INJECTION, SOLUTION INTRAVENOUS
Status: DISCONTINUED | OUTPATIENT
Start: 2024-04-15 | End: 2024-04-15

## 2024-04-15 RX ORDER — KETOROLAC TROMETHAMINE 30 MG/ML
30 INJECTION, SOLUTION INTRAMUSCULAR; INTRAVENOUS EVERY 6 HOURS PRN
Status: DISCONTINUED | OUTPATIENT
Start: 2024-04-15 | End: 2024-04-15 | Stop reason: HOSPADM

## 2024-04-15 RX ORDER — LIDOCAINE HYDROCHLORIDE 20 MG/ML
INJECTION INTRAVENOUS
Status: DISCONTINUED | OUTPATIENT
Start: 2024-04-15 | End: 2024-04-15

## 2024-04-15 RX ORDER — DEXAMETHASONE SODIUM PHOSPHATE 4 MG/ML
INJECTION, SOLUTION INTRA-ARTICULAR; INTRALESIONAL; INTRAMUSCULAR; INTRAVENOUS; SOFT TISSUE
Status: DISCONTINUED | OUTPATIENT
Start: 2024-04-15 | End: 2024-04-15

## 2024-04-15 RX ORDER — ACETAMINOPHEN 325 MG/1
650 TABLET ORAL EVERY 6 HOURS
Qty: 112 TABLET | Refills: 0 | Status: SHIPPED | OUTPATIENT
Start: 2024-04-15 | End: 2024-04-29

## 2024-04-15 RX ORDER — PROCHLORPERAZINE EDISYLATE 5 MG/ML
INJECTION INTRAMUSCULAR; INTRAVENOUS
Status: DISCONTINUED | OUTPATIENT
Start: 2024-04-15 | End: 2024-04-15

## 2024-04-15 RX ORDER — ONDANSETRON HYDROCHLORIDE 2 MG/ML
4 INJECTION, SOLUTION INTRAVENOUS EVERY 4 HOURS PRN
Status: DISCONTINUED | OUTPATIENT
Start: 2024-04-15 | End: 2024-04-15 | Stop reason: HOSPADM

## 2024-04-15 RX ORDER — MEPERIDINE HYDROCHLORIDE 25 MG/ML
12.5 INJECTION INTRAMUSCULAR; INTRAVENOUS; SUBCUTANEOUS ONCE AS NEEDED
Status: DISCONTINUED | OUTPATIENT
Start: 2024-04-15 | End: 2024-04-15 | Stop reason: HOSPADM

## 2024-04-15 RX ORDER — SODIUM CHLORIDE 0.9 % (FLUSH) 0.9 %
3 SYRINGE (ML) INJECTION
Status: DISCONTINUED | OUTPATIENT
Start: 2024-04-15 | End: 2024-04-15 | Stop reason: HOSPADM

## 2024-04-15 RX ORDER — HYDROMORPHONE HYDROCHLORIDE 2 MG/ML
0.4 INJECTION, SOLUTION INTRAMUSCULAR; INTRAVENOUS; SUBCUTANEOUS EVERY 5 MIN PRN
Status: DISCONTINUED | OUTPATIENT
Start: 2024-04-15 | End: 2024-04-15 | Stop reason: HOSPADM

## 2024-04-15 RX ORDER — OXYCODONE HYDROCHLORIDE 5 MG/1
5 TABLET ORAL
Status: DISCONTINUED | OUTPATIENT
Start: 2024-04-15 | End: 2024-04-15 | Stop reason: HOSPADM

## 2024-04-15 RX ORDER — ACETAMINOPHEN 500 MG
1000 TABLET ORAL EVERY 6 HOURS PRN
Status: DISCONTINUED | OUTPATIENT
Start: 2024-04-15 | End: 2024-04-15 | Stop reason: HOSPADM

## 2024-04-15 RX ORDER — OXYCODONE HYDROCHLORIDE 5 MG/1
5 TABLET ORAL EVERY 4 HOURS PRN
Status: DISCONTINUED | OUTPATIENT
Start: 2024-04-15 | End: 2024-04-15 | Stop reason: HOSPADM

## 2024-04-15 RX ORDER — FENTANYL CITRATE 50 UG/ML
INJECTION, SOLUTION INTRAMUSCULAR; INTRAVENOUS
Status: DISCONTINUED | OUTPATIENT
Start: 2024-04-15 | End: 2024-04-15

## 2024-04-15 RX ORDER — MIDAZOLAM HYDROCHLORIDE 1 MG/ML
INJECTION INTRAMUSCULAR; INTRAVENOUS
Status: DISCONTINUED | OUTPATIENT
Start: 2024-04-15 | End: 2024-04-15

## 2024-04-15 RX ORDER — LIDOCAINE HYDROCHLORIDE 10 MG/ML
0.5 INJECTION, SOLUTION EPIDURAL; INFILTRATION; INTRACAUDAL; PERINEURAL ONCE
Status: DISCONTINUED | OUTPATIENT
Start: 2024-04-15 | End: 2024-04-15 | Stop reason: HOSPADM

## 2024-04-15 RX ORDER — DEXMEDETOMIDINE HYDROCHLORIDE 100 UG/ML
INJECTION, SOLUTION INTRAVENOUS
Status: DISCONTINUED | OUTPATIENT
Start: 2024-04-15 | End: 2024-04-15

## 2024-04-15 RX ORDER — DIPHENHYDRAMINE HYDROCHLORIDE 50 MG/ML
25 INJECTION INTRAMUSCULAR; INTRAVENOUS EVERY 6 HOURS PRN
Status: DISCONTINUED | OUTPATIENT
Start: 2024-04-15 | End: 2024-04-15 | Stop reason: HOSPADM

## 2024-04-15 RX ORDER — SODIUM CHLORIDE 9 MG/ML
INJECTION, SOLUTION INTRAVENOUS CONTINUOUS
Status: DISCONTINUED | OUTPATIENT
Start: 2024-04-15 | End: 2024-04-15 | Stop reason: HOSPADM

## 2024-04-15 RX ORDER — ROCURONIUM BROMIDE 10 MG/ML
INJECTION, SOLUTION INTRAVENOUS
Status: DISCONTINUED | OUTPATIENT
Start: 2024-04-15 | End: 2024-04-15

## 2024-04-15 RX ORDER — PREGABALIN 75 MG/1
75 CAPSULE ORAL ONCE
Status: COMPLETED | OUTPATIENT
Start: 2024-04-15 | End: 2024-04-15

## 2024-04-15 RX ORDER — MUPIROCIN 20 MG/G
OINTMENT TOPICAL
Status: DISCONTINUED | OUTPATIENT
Start: 2024-04-15 | End: 2024-04-15 | Stop reason: HOSPADM

## 2024-04-15 RX ADMIN — PROPOFOL 200 MG: 10 INJECTION, EMULSION INTRAVENOUS at 07:04

## 2024-04-15 RX ADMIN — KETOROLAC TROMETHAMINE 30 MG: 30 INJECTION, SOLUTION INTRAMUSCULAR; INTRAVENOUS at 08:04

## 2024-04-15 RX ADMIN — PREGABALIN 75 MG: 75 CAPSULE ORAL at 06:04

## 2024-04-15 RX ADMIN — PROCHLORPERAZINE EDISYLATE 2.5 MG: 5 INJECTION INTRAMUSCULAR; INTRAVENOUS at 08:04

## 2024-04-15 RX ADMIN — PROCHLORPERAZINE EDISYLATE 2.5 MG: 5 INJECTION INTRAMUSCULAR; INTRAVENOUS at 09:04

## 2024-04-15 RX ADMIN — HYDROMORPHONE HYDROCHLORIDE 0.4 MG: 2 INJECTION INTRAMUSCULAR; INTRAVENOUS; SUBCUTANEOUS at 08:04

## 2024-04-15 RX ADMIN — ACETAMINOPHEN 1000 MG: 500 TABLET ORAL at 06:04

## 2024-04-15 RX ADMIN — SUGAMMADEX 200 MG: 100 INJECTION, SOLUTION INTRAVENOUS at 08:04

## 2024-04-15 RX ADMIN — DEXMEDETOMIDINE 4 MCG: 200 INJECTION, SOLUTION INTRAVENOUS at 07:04

## 2024-04-15 RX ADMIN — SODIUM CHLORIDE, SODIUM LACTATE, POTASSIUM CHLORIDE, AND CALCIUM CHLORIDE: 600; 310; 30; 20 INJECTION, SOLUTION INTRAVENOUS at 08:04

## 2024-04-15 RX ADMIN — ONDANSETRON HYDROCHLORIDE 4 MG: 2 INJECTION INTRAMUSCULAR; INTRAVENOUS at 08:04

## 2024-04-15 RX ADMIN — MIDAZOLAM HYDROCHLORIDE 2 MG: 1 INJECTION, SOLUTION INTRAMUSCULAR; INTRAVENOUS at 07:04

## 2024-04-15 RX ADMIN — LIDOCAINE HYDROCHLORIDE 60 MG: 20 INJECTION, SOLUTION INTRAVENOUS at 07:04

## 2024-04-15 RX ADMIN — SCOLOPAMINE TRANSDERMAL SYSTEM 1 PATCH: 1 PATCH, EXTENDED RELEASE TRANSDERMAL at 07:04

## 2024-04-15 RX ADMIN — HYDROMORPHONE HYDROCHLORIDE 0.4 MG: 2 INJECTION INTRAMUSCULAR; INTRAVENOUS; SUBCUTANEOUS at 09:04

## 2024-04-15 RX ADMIN — FAMOTIDINE 20 MG: 20 TABLET ORAL at 06:04

## 2024-04-15 RX ADMIN — SODIUM CHLORIDE, SODIUM LACTATE, POTASSIUM CHLORIDE, AND CALCIUM CHLORIDE: 600; 310; 30; 20 INJECTION, SOLUTION INTRAVENOUS at 07:04

## 2024-04-15 RX ADMIN — MUPIROCIN: 20 OINTMENT TOPICAL at 06:04

## 2024-04-15 RX ADMIN — ROCURONIUM BROMIDE 20 MG: 10 INJECTION INTRAVENOUS at 08:04

## 2024-04-15 RX ADMIN — FENTANYL CITRATE 100 MCG: 0.05 INJECTION, SOLUTION INTRAMUSCULAR; INTRAVENOUS at 07:04

## 2024-04-15 RX ADMIN — ROCURONIUM BROMIDE 50 MG: 10 INJECTION INTRAVENOUS at 07:04

## 2024-04-15 RX ADMIN — DEXAMETHASONE SODIUM PHOSPHATE 6 MG: 4 INJECTION INTRA-ARTICULAR; INTRALESIONAL; INTRAMUSCULAR; INTRAVENOUS; SOFT TISSUE at 07:04

## 2024-04-15 NOTE — PLAN OF CARE
Notified Dr. Joseph that patient voided 200cc after voiding trial. Dr. Joseph stated that a resident would message back via secure chat. Received notice from resident that Ok for patient to be discharged.

## 2024-04-15 NOTE — PLAN OF CARE
Erika Beavers has met all discharge criteria from Phase II. Vital Signs are stable, ambulating  without difficulty. Discharge instructions given, patient verbalized understanding. Discharged from facility via wheelchair in stable condition.

## 2024-04-15 NOTE — TRANSFER OF CARE
Patient ID: Danica is a 28 year old adult.    Chief Complaint   Patient presents with   • Video Visit     Med refill   • Follow-up     Here for follow up   Compliant with medicines. No side effects.   Back at work, opened up an e learning center. Moved in the last week.   Has not been taking Elavil last 2-3 weeks  Feeling in control of her anxiety  In therapy, has been very helpful  Not having any issues with sleep  Exercising 4 days a week  Weight down 30 + lbs since last visit. Trying to not eat as psychological comfort.   Last seen for sinus issue, has since resolved  + PMS, emotional, cramps and nausea first thing in the am x 30 min        Review of Systems    There were no vitals taken for this visit.       Physical Exam   Constitutional: She is oriented to person, place, and time. She appears well-developed and well-nourished.   HENT:   Head: Normocephalic and atraumatic.   Pulmonary/Chest: Effort normal.   Neurological: She is alert and oriented to person, place, and time.   Psychiatric: She has a normal mood and affect. Her speech is normal and behavior is normal. Judgment and thought content normal. Cognition and memory are normal.   Nursing note and vitals reviewed.          Assessment   Problem List Items Addressed This Visit        Behavioral    Moderate anxiety - Primary     Under much better control  Cont off Elavil  Cont Lexapro for now  Reassess in 3-6 months                   Health Maintenance Summary     Varicella Vaccine (1 of 2 - 2-dose childhood series)  Overdue since 9/13/2012    HPV Vaccine (2 - 3-dose series)  Overdue since 9/13/2012    Cervical Cancer Screening (Every 3 Years)  Overdue since 5/3/2015    Influenza Vaccine (1)  Due since 9/1/2020    Depression Screening (Yearly)  Next due on 9/10/2021    DTaP/Tdap/Td Vaccine (8 - Td)  Next due on 10/11/2029    Hepatitis B Vaccine   Completed    Meningococcal Vaccine   Completed    Pneumococcal Vaccine 0-64   Aged Out      This visit is  Anesthesia Transfer of Care Note    Patient: Erika Beavers    Procedure(s) Performed: Procedure(s) (LRB):  LAPAROSCOPY, DIAGNOSTIC (N/A)  DESTRUCTION, ENDOMETRIOSIS (N/A)  LYSIS, ADHESIONS    Patient location: PACU    Anesthesia Type: general    Transport from OR: Transported from OR on room air with adequate spontaneous ventilation    Post pain: adequate analgesia    Post assessment: no apparent anesthetic complications and tolerated procedure well    Post vital signs: stable    Level of consciousness: awake and alert    Nausea/Vomiting: nausea and no vomiting    Complications: none    Transfer of care protocol was followed      Last vitals: Visit Vitals  /76   Pulse 104   Temp 36 °C (96.8 °F) (Temporal)   Resp 16   LMP 04/07/2024 (Exact Date)   SpO2 100%   Breastfeeding No      being performed via video to discuss Video Visit (Med refill) and Follow-up    Clinician Location: [facility] [address] Maurilio Mishra is in [place] and her identity has been established.   She was informed that consent to treat includes permission to submit charges to the applicable insurance on file. Danica was advised regarding the potential risk inherent in video visits, as the assessment may be limited due to what can be seen on the screen which potentially results in an incomplete assessment; as well as either of us may discontinue the video visit if it is felt that the videoconferencing connections are not adequate for his/her situation.           Schedule follow up: in 6 months    Marni H Goldberg, MD

## 2024-04-15 NOTE — INTERVAL H&P NOTE
The patient has been examined and the H&P has been reviewed:    I concur with the findings and no changes have occurred since H&P was written.    Surgery risks, benefits and alternative options discussed and understood by patient/family.    Temp:  [98.2 °F (36.8 °C)] 98.2 °F (36.8 °C)  Pulse:  [68] 68  Resp:  [18] 18  SpO2:  [100 %] 100 %  BP: (108)/(69) 108/69    Plan: To the OR for for Diagnostic laparoscopy and destruction of endometriosis if indicated. Consents signed in chart. All questions answered.    Ruth Lord MD PGY-2  Obstetrics and Gynecology

## 2024-04-15 NOTE — OP NOTE
OPERATIVE REPORT FOR LAPAROSCOPIC EXCISION OF ENDOMETRIOSIS                                                                                                                                 Date of Procedure: 04/15/2024                                                                               SURGEON:  Luigi Castro M.D.                                                                                                                    ASSISTANT:  Ruth Lord MD (RES)                                                                                               PREOPERATIVE DIAGNOSIS:          1. S/P diagnostic laparoscopy    2. Pelvic pain                                                                                   POSTOPERATIVE DIAGNOSIS:       1. S/P diagnostic laparoscopy    2. Pelvic pain                                                                                   PROCEDURE:  Diagnostic laparoscopy, excision of endometriosis, lysis of adhesions lasting for 60 min (mod 22), ureterolysis of retroperitoneal adhesions                                                                                                                  ANESTHESIA:  GETA    BLOOD LOSS:  5  mL.                                                                                                                                      URINE OUTPUT:  150 mL.                                                                                       IV FLUIDS:  700 cc    COMPLICATIONS:  None    SPECIMINES:  Peritoneal biopsies x 2 (right and left ovarian fossa)    DRAINS:  None      FINDINGS:  1) normal uterine cavity  Normal upper abdomen  Normal right tube and ovary  Moderate adhesive disease around left ovary with no clear distal portion of left fallopian tube (fimbraie not seen)  Filmy adhesions in posterior cul de sac of rectum to back of the uterus  Moderate adhesions of sigmoid colon to left side wall  Minimal descent  Stage 2  endometriosis    STATEMENT OF MEDICAL NECESSITY:  Pt is a 28 y.o. year old female who has persistent pain unresponsive to medical treatment.  After discussing risks/benefits/alternatives/and indications, pt wished to proceed with the above stated case.                                                                               PROCEDURE IN DETAIL:  After appropriate consents had been obtained and time out performed, the patient was taken to the procedure room at which time general anesthesia was administered.  The patient was then placed in the lithotomy position, prepped in the appropriate sterile fashion.  A sterile speculum was put in place and a uterine manipulator was inserted after decompression of the bladder..  Attention was then taken to the umbilicus at which time a Veress needle was inserted.  After confirming an opening pressure of 6 mmHg, the abdomen was insufflated to a maximum pressure of 15 mmHg.  A 5mm skin incision was then made and the trocar was inserted under direct visualization.  The patient was placed in [place] and 2 5mm trocars were placed in the bilateral lower quadrants under direct visualization to avoid injury to the underlying structures.  A thorough inspection of the abdomen and pelvis was performed with the above findings.  Attention was first taken to the left side at which time sigmoid adhesions and dense adhesions of left ovary to ovarian fossa was found.  Attention was then taken to the cul-de-sac at which time filmy adhesions of rectum to back of uterus was found.  Attention was then taken to the right side at which time hypervascularity of right ovarian fossa was found.  Attention was turned to the uterus where an incidental perforation from the uterine manipulator was found and confirmed to be hemostatic.  And finally, a thorough upper abdominal survey was performed and no scar tissue was found.  Bilateral uteterolysis was performed to avoid injury.  Adhesiolysis  started on the left releasing the sigmoid off the left side wall with monopolar scissors.  Then, attention was taken to the left ovary which was freed from the bowel and ovarian fossa using monopolar scissors.  The posterior cul-de-sac adhesions were released using monopolar scissors.  And finally, ureterolysis was performed of retroperitoneal adhesions before excising the peritoneum off of the bilateral ovarian fossa (sent to pathology).  All endometriosis was excised using monopolar cautery made hemostatic after tissue removal.  After confirming hemostasis, surgicel powder was  used.  The pneumoperitoneum was released and the trocars were removed under visualization.  The skin was closed using a 4-0 monocryl suture.  The patient was then extubated and taken to the recovery area in stable condition.  The uterine manipulator was removed.       All counts were correct x 2 at the end of the procedure.  Antibiotics were not indicated for this case.

## 2024-04-15 NOTE — DISCHARGE SUMMARY
[facility] - Surgery ([place])  Brief Operative Note    Surgery Date: 4/15/2024     Surgeons and Role:     * Troy Castro MD - Primary     * Ruth Lord MD - Resident - Assisting        Pre-op Diagnosis:  Dyspareunia, female [N94.10]  Pelvic pain [R10.2]    Post-op Diagnosis:  Post-Op Diagnosis Codes:     * Dyspareunia, female [N94.10]     * Pelvic pain [R10.2]    Procedure(s) (LRB):  LAPAROSCOPY, DIAGNOSTIC (N/A)  DESTRUCTION, ENDOMETRIOSIS (N/A)  LYSIS, ADHESIONS    Anesthesia: General    Operative Findings: See op note.     Estimated Blood Loss: * No values recorded between 4/15/2024  7:36 AM and 4/15/2024  8:29 AM *         Specimens:   Specimen (24h ago, onward)       Start     Ordered    04/15/24 0822  Specimen to Pathology, Surgery Gynecology and Obstetrics  Once        Comments: Pre-op Diagnosis: Dyspareunia, female [N94.10]Pelvic pain [R10.2]Procedure(s):LAPAROSCOPY, DIAGNOSTICDESTRUCTION, ENDOMETRIOSIS Number of specimens: Name of specimens: 1. Right ovarian fossa2. Left ovarian fossa     References:    Click here for ordering Quick Tip   Question Answer Comment   Procedure Type: Gynecology and Obstetrics    Specimen Class: Routine/Screening    Which provider would you like to cc? TROY CASTRO    Release to patient Immediate        04/15/24 0822                      Discharge Note    OUTCOME: Patient tolerated treatment/procedure well without complication and is now ready for discharge.    DISPOSITION: Home or Self Care    FINAL DIAGNOSIS:  S/P laparoscopy    FOLLOWUP: In clinic    DISCHARGE INSTRUCTIONS:    Discharge Procedure Orders   Diet general     Lifting restrictions   Order Comments: No lifting greater than 15 pounds for six weeks.     Other restrictions (specify):   Order Comments: PELVIC REST:  No douching, tampons, or intercourse for 6 weeks.    If prescribed, vaginal estrogen cream may be used during the postoperative period.     DRIVING:  No driving while on narcotics. Driving may  be resumed initially with a competent passenger one to two weeks after surgery if no longer taking narcotics.     EXERCISE:  For six weeks your exercise should be limited to walking. You may walk as far as you wish, as long as you increase your level of exertion gradually and avoid slippery surfaces. You may climb stairs as needed to get around, but should not use stair climbing for exercise.     Remove dressing in 24 hours   Order Comments: If you have a bandage on wound, you may remove it the day after dismissal.  If you had steri-strips remove them once they begin to peel off (usually 2 weeks). Keep incision clean and dry.  Inspect the incision daily for signs and symptoms of infection.     Wound care routine (specify)   Order Comments: WOUND CARE:  If you have a band-aid or bandage on your wound, you may remove it the day after dismissal.  If you had steri-strips remove them once they begin to peel off (usually 2 weeks).  If your steri-strips still haven't come off in 2 weeks, please remove them. You may wash the wound with mild soap and water.   You may shower at any time but should avoid immersing any abdominal incisions in water for at least two weeks after surgery or until the wound is completely healed. If given, please shower with Hibiclens soap until bottle is completely finished. Keep your wound clean and dry.  You should observe your incision for signs of infection which include redness, warmth, drainage or fever.     Call MD for:  temperature >100.4     Call MD for:  persistent nausea and vomiting     Call MD for:  severe uncontrolled pain     Call MD for:  difficulty breathing, headache or visual disturbances     Call MD for:  redness, tenderness, or signs of infection (pain, swelling, redness, odor or green/yellow discharge around incision site)     Call MD for:  hives     Call MD for:   Order Comments: inability to void,urine is ketchup colored or you have large clots, vaginal bleeding is heavier  than a period.    VAGINAL DISCHARGE: You may develop a vaginal discharge and intermittent vaginal spotting after surgery and up to 6 weeks postoperatively.  The discharge may have an odor and may change in color but it is normal.  This is due to dissolving stiches.  Contact your surgical team if you develop vaginal or vulvar irritation along with a discharge.  Also contact your surgical team if you have vaginal discharge that smells like urine or stool.    CONSTIPATION REMEDIES: Patients are often constipated after surgery or with use of oral narcotic medicine. You should continue to take the stool softener, Senokot-S during the next six weeks, and consume adequate amounts of water.  If you have not had a bowel movement for 3 days after dismissal, or are uncomfortable and unable to pass stool, please try one or all of the following measures:  1.  Milk of Magnesia - 30 cc by mouth every 12 hours   2.  Dulcolax suppository - One suppository per rectum every 4-6 hours   3.  Metamucil, Fibercon or other bulk former - use as directed  4.  Fleets Enema        PAIN MEDICATIONS:     Take your pain medications as instructed. It is best to take pain medications before your pain becomes severe. This will allow you to take less medication yet have better pain relief. For the first 2 or 3 days it may be helpful to take your pain medications on a regular schedule (e.g. every 4 to 6 hours). This will help you to keep your pain under better control. You should then begin to take fewer medications each day until you no longer need them. Do not take pain medication on an empty stomach. This may lead to nausea and vomiting.     Activity as tolerated   Order Comments: Return to normal activity slowly as you feel able.  For 6 weeks your exercise should be limited to walking.  You may walk as far as you wish, as long as you increase your level of exertion gradually and avoid slippery surfaces.    If you had a hysterectomy at the surgery  do not insert anything in your vagina for 9 weeks.     Shower on day dressing removed (No bath)   Order Comments: You may shower at any time but should avoid immersing any abdominal incisions in water for at least 2 weeks after surgery or until the wound is completely healed.  If given, please shower with Hibaclens soap until bottle is completely finish        Medication List        START taking these medications      acetaminophen 325 MG tablet  Commonly known as: TYLENOL  Take 2 tablets (650 mg total) by mouth every 6 (six) hours. Alternate between ibuprofen and tylenol every 3 hours. For example: @0800: ibuprofen 600mg @1100: tylenol 650mg @1400: ibuprofen 600mg @1700: tylenol 650 mg @2000: ibuprofen 600mg for 14 days     ibuprofen 600 MG tablet  Commonly known as: ADVIL,MOTRIN  Take 1 tablet (600 mg total) by mouth every 6 (six) hours. Alternate between ibuprofen and tylenol every 3 hours. For example: @0800: ibuprofen 600mg @1100: tylenol 650mg @1400: ibuprofen 600mg @1700: tylenol 650 mg @2000: ibuprofen 600mg for 14 days     oxyCODONE 5 MG immediate release tablet  Commonly known as: ROXICODONE  Take 1 tablet (5 mg total) by mouth every 4 (four) hours as needed for Pain.            CONTINUE taking these medications      * lisdexamfetamine 50 MG capsule  Commonly known as: VYVANSE  Take 1 capsule by mouth once a day     * lisdexamfetamine 50 MG capsule  Commonly known as: VYVANSE  Take 1 capsule by mouth once a day     * VYVANSE 50 mg capsule  Generic drug: lisdexamfetamine  Take 1 capsule by mouth once a day     * lisdexamfetamine 50 MG capsule  Commonly known as: VYVANSE  Take 1 capsule by mouth once a day     norethindrone-ethinyl estradiol-iron 1.5 mg-30 mcg (21)/75 mg (7) tablet  Commonly known as: JUNEL FE 1.5/30 (28)  Take 1 tablet by mouth once daily.           * This list has 4 medication(s) that are the same as other medications prescribed for you. Read the directions carefully, and ask your doctor  or other care provider to review them with you.                   Where to Get Your Medications        These medications were sent to [facility]  [address] [address]      Hours: Mon-Fri, 8a-5:30p Phone: [phone]   acetaminophen 325 MG tablet  ibuprofen 600 MG tablet  oxyCODONE 5 MG immediate release tablet           Virgie Joseph MD  PGY 3  Obstetrics and Gynecology

## 2024-04-15 NOTE — ANESTHESIA PROCEDURE NOTES
Intubation    Date/Time: 4/15/2024 7:22 AM    Performed by: Melissa Paula CRNA  Authorized by: Beto Rubio MD    Intubation:     Induction:  Intravenous    Intubated:  Postinduction    Mask Ventilation:  Easy mask    Attempts:  1    Attempted By:  CRNA    Method of Intubation:  Video laryngoscopy    Blade:  Powers 3    Laryngeal View Grade: Grade I - full view of cords      Difficult Airway Encountered?: No      Complications:  None    Airway Device:  Oral endotracheal tube    Airway Device Size:  7.0    Style/Cuff Inflation:  Cuffed (inflated to minimal occlusive pressure)    Inflation Amount (mL):  5    Tube secured:  21    Secured at:  The lips    Placement Verified By:  Capnometry    Complicating Factors:  None    Findings Post-Intubation:  BS equal bilateral and atraumatic/condition of teeth unchanged

## 2024-04-15 NOTE — ANESTHESIA POSTPROCEDURE EVALUATION
Anesthesia Post Evaluation    Patient: Erika Beavers    Procedure(s) Performed: Procedure(s) (LRB):  LAPAROSCOPY, DIAGNOSTIC (N/A)  DESTRUCTION, ENDOMETRIOSIS (N/A)  LYSIS, ADHESIONS (N/A)    Final Anesthesia Type: general      Patient location during evaluation: PACU  Patient participation: Yes- Able to Participate  Level of consciousness: awake and alert  Post-procedure vital signs: reviewed and stable  Pain management: adequate  Airway patency: patent    PONV status at discharge: No PONV  Anesthetic complications: no      Cardiovascular status: blood pressure returned to baseline  Respiratory status: unassisted  Hydration status: euvolemic  Follow-up not needed.              Vitals Value Taken Time   /66 04/15/24 0955   Temp 36.2 °C (97.2 °F) 04/15/24 0941   Pulse 70 04/15/24 0955   Resp 16 04/15/24 0955   SpO2 100 % 04/15/24 0955         Event Time   Out of Recovery 09:58:24         Pain/Daniel Score: Pain Rating Prior to Med Admin: 7 (4/15/2024  9:32 AM)  Pain Rating Post Med Admin: 5 (4/15/2024  9:36 AM)  Daniel Score: 9 (4/15/2024  9:40 AM)

## 2024-04-16 VITALS
OXYGEN SATURATION: 99 % | HEART RATE: 70 BPM | RESPIRATION RATE: 18 BRPM | SYSTOLIC BLOOD PRESSURE: 104 MMHG | TEMPERATURE: 98 F | DIASTOLIC BLOOD PRESSURE: 66 MMHG

## 2024-04-17 ENCOUNTER — TELEPHONE (OUTPATIENT)
Dept: OBSTETRICS AND GYNECOLOGY | Facility: CLINIC | Age: 29
End: 2024-04-17
Payer: COMMERCIAL

## 2024-04-17 LAB
FINAL PATHOLOGIC DIAGNOSIS: NORMAL
GROSS: NORMAL
Lab: NORMAL

## 2024-04-17 NOTE — TELEPHONE ENCOUNTER
----- Message from Coby Slaughter sent at 4/17/2024 10:48 AM CDT -----      Name of Who is Calling: DELGADO SANDRA [[id number]]      What is the request in detail: Pt called regarding post-op appt.Please contact to further discuss and advise.          Can the clinic reply by [facility]: Y      [facility]  [phone]

## 2024-04-17 NOTE — TELEPHONE ENCOUNTER
Called pt in regards to scheduling post-op appt, no answer, lvm asking pt to call office along with possible time/date.

## 2024-06-06 ENCOUNTER — OFFICE VISIT (OUTPATIENT)
Dept: OBSTETRICS AND GYNECOLOGY | Facility: CLINIC | Age: 29
End: 2024-06-06
Payer: COMMERCIAL

## 2024-06-06 VITALS
DIASTOLIC BLOOD PRESSURE: 72 MMHG | WEIGHT: 119.94 LBS | HEIGHT: 64 IN | BODY MASS INDEX: 20.47 KG/M2 | SYSTOLIC BLOOD PRESSURE: 112 MMHG

## 2024-06-06 DIAGNOSIS — Z30.41 ORAL CONTRACEPTIVE PILL SURVEILLANCE: ICD-10-CM

## 2024-06-06 DIAGNOSIS — N80.332: Primary | ICD-10-CM

## 2024-06-06 PROBLEM — Z98.890 S/P LAPAROSCOPY: Status: RESOLVED | Noted: 2024-04-15 | Resolved: 2024-06-06

## 2024-06-06 PROBLEM — R10.2 PELVIC PAIN: Status: RESOLVED | Noted: 2022-09-13 | Resolved: 2024-06-06

## 2024-06-06 PROCEDURE — 3074F SYST BP LT 130 MM HG: CPT | Mod: CPTII,S$GLB,, | Performed by: OBSTETRICS & GYNECOLOGY

## 2024-06-06 PROCEDURE — 99999 PR PBB SHADOW E&M-EST. PATIENT-LVL III: CPT | Mod: PBBFAC,,, | Performed by: OBSTETRICS & GYNECOLOGY

## 2024-06-06 PROCEDURE — 1160F RVW MEDS BY RX/DR IN RCRD: CPT | Mod: CPTII,S$GLB,, | Performed by: OBSTETRICS & GYNECOLOGY

## 2024-06-06 PROCEDURE — 99024 POSTOP FOLLOW-UP VISIT: CPT | Mod: S$GLB,,, | Performed by: OBSTETRICS & GYNECOLOGY

## 2024-06-06 PROCEDURE — 1159F MED LIST DOCD IN RCRD: CPT | Mod: CPTII,S$GLB,, | Performed by: OBSTETRICS & GYNECOLOGY

## 2024-06-06 PROCEDURE — 3078F DIAST BP <80 MM HG: CPT | Mod: CPTII,S$GLB,, | Performed by: OBSTETRICS & GYNECOLOGY

## 2024-06-06 RX ORDER — NORETHINDRONE ACETATE AND ETHINYL ESTRADIOL 1.5-30(21)
1 KIT ORAL DAILY
Qty: 84 TABLET | Refills: 4 | Status: SHIPPED | OUTPATIENT
Start: 2024-06-06 | End: 2024-08-08

## 2024-06-06 NOTE — PROGRESS NOTES
"CC: Postop visit    Erika Beavers is a 28 y.o. female  post-op from a Dx LSC, excision of endometriosis on 4/15/24.  Patient is doing well postoperatively.  No pain.  No bowel or bladder complaints.  No fever.      The pathology revealed:  No endo seen on path    Past Medical History:   Diagnosis Date    Endometriosis, unspecified     Ovarian cyst      Past Surgical History:   Procedure Laterality Date    DIAGNOSTIC LAPAROSCOPY N/A 4/15/2024    Procedure: LAPAROSCOPY, DIAGNOSTIC;  Surgeon: Luigi Castro MD;  Location: [facility] OR;  Service: OB/GYN;  Laterality: N/A;    LYSIS OF ADHESIONS N/A 4/15/2024    Procedure: LYSIS, ADHESIONS;  Surgeon: Luigi Castro MD;  Location: [facility] OR;  Service: OB/GYN;  Laterality: N/A;    none      SURGICAL REMOVAL OF ENDOMETRIOSIS N/A 4/15/2024    Procedure: DESTRUCTION, ENDOMETRIOSIS;  Surgeon: Luigi Castro MD;  Location: [facility] OR;  Service: OB/GYN;  Laterality: N/A;     Family History   Problem Relation Name Age of Onset    Cholecystitis Father      Breast cancer Paternal Grandmother  74    Cancer Paternal Grandmother           thyroid    Hypertension Neg Hx      Diabetes Neg Hx      Nephrolithiasis Neg Hx      Ovarian cysts Neg Hx      Stroke Neg Hx      Colon cancer Neg Hx       Social History     Tobacco Use    Smoking status: Never    Smokeless tobacco: Never   Substance Use Topics    Alcohol use: Yes     Alcohol/week: 0.0 standard drinks of alcohol     Comment: socially    Drug use: No     OB History    Para Term  AB Living   0 0 0 0 0 0   SAB IAB Ectopic Multiple Live Births   0 0 0 0     Obstetric Comments   Menarche- 15       /74   Ht 5' 4" (1.626 m)   Wt 54.4 kg (119 lb 14.9 oz)   BMI 20.59 kg/m²     ROS:  GENERAL: No fever, chills, fatigability or weight loss.  VULVAR: No pain, no lesions and no itching.  VAGINAL: No relaxation, no itching, no discharge, no abnormal bleeding and no lesions.  ABDOMEN: No abdominal pain. Denies nausea. " Denies vomiting. No diarrhea. No constipation  BREAST: Denies pain. No lumps. No discharge.  URINARY: No incontinence, no nocturia, no frequency and no dysuria.  CARDIOVASCULAR: No chest pain. No shortness of breath. No leg cramps.  NEUROLOGICAL: No headaches. No vision changes.    PE:   Abdominal:  soft.  Incision c/d/i        ICD-10-CM ICD-9-CM    1. Superficial endometriosis of left pelvic sidewall  N80.332 617.3 norethindrone-ethinyl estradiol-iron (JUNEL FE 1.5/30, 28,) 1.5 mg-30 mcg (21)/75 mg (7) tablet      2. Oral contraceptive pill surveillance  Z30.41 V25.41 norethindrone-ethinyl estradiol-iron (JUNEL FE 1.5/30, 28,) 1.5 mg-30 mcg (21)/75 mg (7) tablet            Follow up in 1 year for WWE.  Discussion today about the findings during surgery and the lack of correlation with pathology.  Given that the scar tissue was consistent with patterns of endometriosis (stage II), we would recommend continuous birth control pills.  Patient is feeling much better after surgery.  If pain returns, we can then consider medications such as oral GnRH antagonists.  All questions answered

## 2025-06-16 ENCOUNTER — PATIENT MESSAGE (OUTPATIENT)
Dept: OBSTETRICS AND GYNECOLOGY | Facility: CLINIC | Age: 30
End: 2025-06-16
Payer: COMMERCIAL

## 2025-06-16 DIAGNOSIS — Z30.41 ORAL CONTRACEPTIVE PILL SURVEILLANCE: ICD-10-CM

## 2025-06-16 DIAGNOSIS — N80.332: ICD-10-CM

## 2025-06-16 RX ORDER — NORETHINDRONE ACETATE AND ETHINYL ESTRADIOL 1.5-30(21)
1 KIT ORAL DAILY
Qty: 84 TABLET | Refills: 4 | Status: SHIPPED | OUTPATIENT
Start: 2025-06-16 | End: 2025-08-18

## (undated) DEVICE — GLOVE SENSICARE PI SURG 6.5

## (undated) DEVICE — KIT WING PAD POSITIONING

## (undated) DEVICE — PACK LAPAROSCOPY BAPTIST

## (undated) DEVICE — PAD PREP CUFFED NS 24X48IN

## (undated) DEVICE — TOWEL OR DISP STRL BLUE 4/PK

## (undated) DEVICE — ELECTRODE REM PLYHSV RETURN 9

## (undated) DEVICE — SOL IRR SOD CHL .9% POUR

## (undated) DEVICE — SCISSOR 5MMX35CM DIRECT DRIVE

## (undated) DEVICE — SOUND DISPOSABLE UTERINE W/NOT

## (undated) DEVICE — SOL POVIDONE SCRUB IODINE 4 OZ

## (undated) DEVICE — TROCAR KII FIOS 5MM X 100MM

## (undated) DEVICE — SOL POVIDONE PREP IODINE 4 OZ

## (undated) DEVICE — SUT MCRYL PLUS 4-0 PS2 27IN

## (undated) DEVICE — NDL INSUFFLATION VERRES 120MM

## (undated) DEVICE — MANIPULATOR VCARE PLUS 32MM SM

## (undated) DEVICE — HEMOSTAT SURGICEL PWD 3G

## (undated) DEVICE — SYS SEE SHARP SCP ANTIFG LNG

## (undated) DEVICE — CONTAINER SPEC OR STRL 4.5OZ

## (undated) DEVICE — SOL NORMAL USPCA 0.9%

## (undated) DEVICE — APPLICATOR SURGICEL ENDOSCOPIC